# Patient Record
Sex: FEMALE | Race: WHITE | Employment: OTHER | URBAN - METROPOLITAN AREA
[De-identification: names, ages, dates, MRNs, and addresses within clinical notes are randomized per-mention and may not be internally consistent; named-entity substitution may affect disease eponyms.]

---

## 2018-03-05 PROBLEM — I10 ESSENTIAL HYPERTENSION: Status: ACTIVE | Noted: 2018-01-10

## 2018-03-05 PROBLEM — E78.2 MIXED HYPERLIPIDEMIA: Status: ACTIVE | Noted: 2018-01-10

## 2018-03-06 PROBLEM — Z78.0 MENOPAUSE: Status: ACTIVE | Noted: 2018-03-06

## 2018-03-06 PROCEDURE — 81003 URINALYSIS AUTO W/O SCOPE: CPT | Performed by: FAMILY MEDICINE

## 2018-03-06 PROCEDURE — 82043 UR ALBUMIN QUANTITATIVE: CPT | Performed by: FAMILY MEDICINE

## 2018-03-06 PROCEDURE — 82570 ASSAY OF URINE CREATININE: CPT | Performed by: FAMILY MEDICINE

## 2019-03-13 PROCEDURE — 81001 URINALYSIS AUTO W/SCOPE: CPT | Performed by: FAMILY MEDICINE

## 2019-03-13 PROCEDURE — 87086 URINE CULTURE/COLONY COUNT: CPT | Performed by: FAMILY MEDICINE

## 2020-08-06 ENCOUNTER — OFFICE VISIT (OUTPATIENT)
Dept: PODIATRY CLINIC | Facility: CLINIC | Age: 80
End: 2020-08-06
Payer: MEDICARE

## 2020-08-06 DIAGNOSIS — M79.675 PAIN IN TOES OF BOTH FEET: Primary | ICD-10-CM

## 2020-08-06 DIAGNOSIS — M79.674 PAIN IN TOES OF BOTH FEET: Primary | ICD-10-CM

## 2020-08-06 DIAGNOSIS — B35.1 ONYCHOMYCOSIS: ICD-10-CM

## 2020-08-06 PROCEDURE — 99202 OFFICE O/P NEW SF 15 MIN: CPT | Performed by: PODIATRIST

## 2020-08-06 PROCEDURE — 11720 DEBRIDE NAIL 1-5: CPT | Performed by: PODIATRIST

## 2020-08-06 NOTE — PROGRESS NOTES
HPI:    Patient ID: Sabina Aiken is a 70year old female. Pleasant 59-year-old female presents as a new patient to me and states that she is self-referred.   She is here with her daughter and the primary concern is painful toenails that she is no long debridement there is no ulceration or infection no open or draining was noted. I anticipate relief by this care and anticipate resolution of symptoms. I discussed the likely recurrence and will follow-up if and when that occurs.   She is well-informed

## 2020-12-03 ENCOUNTER — OFFICE VISIT (OUTPATIENT)
Dept: DERMATOLOGY CLINIC | Facility: CLINIC | Age: 80
End: 2020-12-03
Payer: MEDICARE

## 2020-12-03 DIAGNOSIS — L30.9 DERMATITIS: Primary | ICD-10-CM

## 2020-12-03 DIAGNOSIS — L65.9 ALOPECIA: ICD-10-CM

## 2020-12-03 DIAGNOSIS — Z51.81 MEDICATION MONITORING ENCOUNTER: ICD-10-CM

## 2020-12-03 DIAGNOSIS — L25.9 CONTACT DERMATITIS AND ECZEMA: ICD-10-CM

## 2020-12-03 PROCEDURE — 99202 OFFICE O/P NEW SF 15 MIN: CPT | Performed by: DERMATOLOGY

## 2020-12-03 PROCEDURE — G0463 HOSPITAL OUTPT CLINIC VISIT: HCPCS | Performed by: DERMATOLOGY

## 2020-12-03 RX ORDER — KETOCONAZOLE 20 MG/ML
SHAMPOO TOPICAL
Qty: 120 ML | Refills: 0 | Status: SHIPPED | OUTPATIENT
Start: 2020-12-03 | End: 2021-03-04

## 2020-12-03 RX ORDER — TRIAMCINOLONE ACETONIDE 5 MG/G
CREAM TOPICAL
Qty: 30 G | Refills: 0 | Status: SHIPPED | OUTPATIENT
Start: 2020-12-03 | End: 2021-01-21

## 2020-12-04 ENCOUNTER — LAB ENCOUNTER (OUTPATIENT)
Dept: LAB | Age: 80
End: 2020-12-04
Attending: DERMATOLOGY
Payer: MEDICARE

## 2020-12-04 ENCOUNTER — MED REC SCAN ONLY (OUTPATIENT)
Dept: DERMATOLOGY CLINIC | Facility: CLINIC | Age: 80
End: 2020-12-04

## 2020-12-04 ENCOUNTER — TELEPHONE (OUTPATIENT)
Dept: DERMATOLOGY CLINIC | Facility: CLINIC | Age: 80
End: 2020-12-04

## 2020-12-04 DIAGNOSIS — Z51.81 MEDICATION MONITORING ENCOUNTER: ICD-10-CM

## 2020-12-04 DIAGNOSIS — L30.9 DERMATITIS: ICD-10-CM

## 2020-12-04 DIAGNOSIS — L65.9 ALOPECIA: ICD-10-CM

## 2020-12-04 PROCEDURE — 82728 ASSAY OF FERRITIN: CPT

## 2020-12-04 PROCEDURE — 85025 COMPLETE CBC W/AUTO DIFF WBC: CPT

## 2020-12-04 PROCEDURE — 84443 ASSAY THYROID STIM HORMONE: CPT

## 2020-12-04 PROCEDURE — 36415 COLL VENOUS BLD VENIPUNCTURE: CPT

## 2020-12-04 NOTE — TELEPHONE ENCOUNTER
Pt stopped by [place] office dropped off copies from previous Dermatology Dr she had seen in the past, it has medication she was given for her problem. Pt stts Dr wanted a copy. Pt also wanted Dr Tina Danielle to know she had her labs done today.  The only one the

## 2020-12-05 ENCOUNTER — TELEPHONE (OUTPATIENT)
Dept: DERMATOLOGY CLINIC | Facility: CLINIC | Age: 80
End: 2020-12-05

## 2020-12-05 NOTE — TELEPHONE ENCOUNTER
Noted.  Vit d screening is commonly only covered for certain diagnoses. Ok to wait until she sees pcp. Will review notes nest week when I am in the [place] office. Await labs.

## 2020-12-05 NOTE — TELEPHONE ENCOUNTER
Pt called today stating, possible reaction from Triamcinolone that was prescribed on Thursday. Pt used medication X2 Friday. C/o eye puff ness yesterday. Pt pulled up records from previous DrIva And noticed that she has had an allergic reaction in the past fo

## 2020-12-05 NOTE — TELEPHONE ENCOUNTER
Records she dropped off after her visit are at [facility], so I cannot review until next week. Use 1 % hydrocortisone for now.      No past Derm records in [address] [place] [address] Rd

## 2020-12-07 NOTE — PROGRESS NOTES
Pt informed of test results - she does not have F/U scheduled, wants to know when you'd like her to follow up?

## 2020-12-08 NOTE — PROGRESS NOTES
S/w pt - she requested a F/U this week, \"I just want her to look at my face again to make sure I'm doing everything right\" appt made for 12/10/20 - pt aware she is seeing Dr. Tommie San at 10:20am - states she will call their office and try to change her iona

## 2020-12-10 ENCOUNTER — OFFICE VISIT (OUTPATIENT)
Dept: PODIATRY CLINIC | Facility: CLINIC | Age: 80
End: 2020-12-10
Payer: MEDICARE

## 2020-12-10 ENCOUNTER — OFFICE VISIT (OUTPATIENT)
Dept: DERMATOLOGY CLINIC | Facility: CLINIC | Age: 80
End: 2020-12-10
Payer: MEDICARE

## 2020-12-10 DIAGNOSIS — M79.675 PAIN IN TOES OF BOTH FEET: Primary | ICD-10-CM

## 2020-12-10 DIAGNOSIS — L25.9 CONTACT DERMATITIS AND ECZEMA: ICD-10-CM

## 2020-12-10 DIAGNOSIS — L30.9 DERMATITIS: Primary | ICD-10-CM

## 2020-12-10 DIAGNOSIS — B35.1 ONYCHOMYCOSIS: ICD-10-CM

## 2020-12-10 DIAGNOSIS — L65.9 ALOPECIA: ICD-10-CM

## 2020-12-10 DIAGNOSIS — Z51.81 MEDICATION MONITORING ENCOUNTER: ICD-10-CM

## 2020-12-10 DIAGNOSIS — M79.674 PAIN IN TOES OF BOTH FEET: Primary | ICD-10-CM

## 2020-12-10 PROCEDURE — 99213 OFFICE O/P EST LOW 20 MIN: CPT | Performed by: DERMATOLOGY

## 2020-12-10 PROCEDURE — G0463 HOSPITAL OUTPT CLINIC VISIT: HCPCS | Performed by: DERMATOLOGY

## 2020-12-10 PROCEDURE — 11721 DEBRIDE NAIL 6 OR MORE: CPT | Performed by: PODIATRIST

## 2020-12-10 RX ORDER — TOBRAMYCIN AND DEXAMETHASONE 3; 1 MG/ML; MG/ML
SUSPENSION/ DROPS OPHTHALMIC
COMMUNITY
Start: 2020-12-08 | End: 2021-06-22 | Stop reason: ALTCHOICE

## 2020-12-10 NOTE — PROGRESS NOTES
HPI:    Patient ID: Brianna Murguia is a 68year old female. 79-year-old female presents with recurrent pain associated with her nails. She reports relief by the previous care. The last time I saw this patient was August 6.     ROS:   I did go over her nails was accomplished today without incident. I reduced nail, subungual debris, and some keratosis. No ulcerations or infections were noted upon debridement. I anticipate relief and will see patient for care if and when symptoms redevelop.   She is well

## 2020-12-13 NOTE — PROGRESS NOTES
Andres Smith is a 68year old female. HPI:     CC:  Patient presents with:  Rash: new pt. presents with new rash to face. Tried cortisone 10 cream x3 days with good results but then flared up again. Face is dry, red. Denies itching.   Lesion: please c mouth daily. • Cholecalciferol (VITAMIN D) 1000 units Oral Tab Take 1,000 Units by mouth daily.      • tobramycin-dexamethasone 0.3-0.1 % Ophthalmic Suspension INSTILL 1 DROP IN EACH EYE TWICE DAILY FOR 10 DAYS     • benzonatate 200 MG Oral Cap Take 1 c on file        Attends Sikhism service: Not on file        Active member of club or organization: Not on file        Attends meetings of clubs or organizations: Not on file        Relationship status: Not on file      Intimate partner violence        [facility] nothing else recently. Has very sensitive skin. Has noted more longstanding hair thinning alopecia worsening over the last few months. No recent labs. Does feel that masks have been worsening symptoms.   Has been very careful with products in the past v triamcinolone acetonide 0.5 % External Cream 30 g 0     Sig: Use bid as directed     Patient not taking: Reported on 12/10/2020   • Ketoconazole 2 % External Shampoo 120 mL 0     Sig: Apply to scalp 2 times per week.          Dermatitis  (primary encounter diagnoses. Pros cons of various therapies, risks benefits discussed. Pathophysiology discussed with patient. Therapeutic options reviewed. See  Medications in grid. Instructions reviewed at length.     Benign nevi, seborrheic  keratoses, cherry angiomas:

## 2020-12-13 NOTE — PROGRESS NOTES
Ryland Colón is a 68year old female. Patient presents with:  Rash: LOV 12/3/2020. Follow up rash to face. Applied TAC that was prescribed at last vist but pt suspects medication was watering when she used the medication on her face.  Eye doctor r Apply to scalp 2 times per week. 120 mL 0   • LISINOPRIL-HYDROCHLOROTHIAZIDE 20-25 MG Oral Tab TAKE 1 TABLET BY MOUTH DAILY 90 tablet 0   • benzonatate 200 MG Oral Cap Take 1 capsule (200 mg total) by mouth 3 (three) times daily as needed for cough.  30 cap Substance and Sexual Activity      Alcohol use: No      Drug use: No      Sexual activity: Not on file    Lifestyle      Physical activity        Days per week: Not on file        Minutes per session: Not on file      Stress: Not on file    Relationships visit. Follow-up facial rash. Using cortisone 10 with improvement. Triamcinolone has old notes with possible reaction with similar cream from PCP several years ago. Reviewed. Note sent to scanning. Past history of dermatitis unknown cause.   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   We will continue hydrocortisone as ne diagnosis)  Alopecia  Medication monitoring encounter  Contact dermatitis and eczema    Results From Past 48 Hours:  No results found for this or any previous visit (from the past 50 hour(s)).     Meds This Visit:      Imaging Orders:  None     Referral Hillary Julio

## 2020-12-18 ENCOUNTER — TELEPHONE (OUTPATIENT)
Dept: DERMATOLOGY CLINIC | Facility: CLINIC | Age: 80
End: 2020-12-18

## 2020-12-18 NOTE — TELEPHONE ENCOUNTER
Left a message for patient to contact our office regarding her message. See patient's message below.

## 2020-12-18 NOTE — TELEPHONE ENCOUNTER
Patient called    States lobe of ear is hurting her. What can she put on her ear lobe. Sleeping at night it hurts. Does not hurt during the day. Also ears itching inside.  Please call

## 2020-12-21 ENCOUNTER — MED REC SCAN ONLY (OUTPATIENT)
Dept: DERMATOLOGY CLINIC | Facility: CLINIC | Age: 80
End: 2020-12-21

## 2021-01-13 ENCOUNTER — TELEPHONE (OUTPATIENT)
Dept: DERMATOLOGY CLINIC | Facility: CLINIC | Age: 81
End: 2021-01-13

## 2021-01-13 NOTE — TELEPHONE ENCOUNTER
LOV 12/10/20. Pt. Requesting new medication for dermatitis to face. Patient stopped using cortisone 10 x 2 weeks ago b/c rash to face was getting pinker after applying cortisone. Now patient only using cerave but face still pink to forehead and cheeks.

## 2021-01-13 NOTE — TELEPHONE ENCOUNTER
Pt states the med is not working for her face rash. Pt is coming in 1/21/21. Pt want know if doc want to gave her diff med.

## 2021-01-14 NOTE — TELEPHONE ENCOUNTER
If not itching, burning or painful, then would wait until her visit. She has a very poor tolerance of topicals. Continue Cerave.

## 2021-01-15 NOTE — TELEPHONE ENCOUNTER
Spoke with patient. Verified name and . Informed patient per Dr. González Simpson to continue with [facility] to the face as long as she does not have burning , itching or pain, will address at her visit on 21.  Patient verbalizes understanding of medication dire

## 2021-01-21 ENCOUNTER — OFFICE VISIT (OUTPATIENT)
Dept: DERMATOLOGY CLINIC | Facility: CLINIC | Age: 81
End: 2021-01-21
Payer: MEDICARE

## 2021-01-21 DIAGNOSIS — L30.9 DERMATITIS: Primary | ICD-10-CM

## 2021-01-21 PROCEDURE — 99213 OFFICE O/P EST LOW 20 MIN: CPT | Performed by: DERMATOLOGY

## 2021-01-21 NOTE — PATIENT INSTRUCTIONS
Bump above r brow is a milium, benign cyst from irritation, dryness. The smaller bumps are sebaceous hyperplasia from aging. For the red scaly rash ( seborrheic dermatitis) use the ciclopiroxolamine cream 2 times a day along with hydrocortisone.

## 2021-02-01 NOTE — PROGRESS NOTES
Edis Hector is a 68year old female. Patient presents with:  Derm Problem: LOV 12/10/2020. Follow up rash to face. States she feels like she is allergic to TAC. Pt applying moisturizer the last 2 weeks.              Triamcinolone  Current Outpati Omega-3-acid Ethyl Esters 1 g Oral Cap Take 1 capsule (1 g total) by mouth 2 (two) times daily. 180 capsule 2   • Lactobacillus-Inulin (CULTURELLE DIGESTIVE HEALTH OR) Take 1 capsule by mouth daily.      • Cholecalciferol (VITAMIN D) 1000 units Oral Tab [facility] connections        Talks on phone: Not on file        Gets together: Not on file        Attends Nondenominational service: Not on file        Active member of club or organization: Not on file        Attends meetings of clubs or organizations: Not on file        R unremarkable    Has noted decreased itching and irritation with 1-2 applications of the hydrocortisone. Previously    Patient presents with concerns above. ROS:    Denies any other systemic complaints.   No fevers, chills, night sweats, sensitivity to Continue supportive care. Caution with perm. Okay to wait on the ketoconazole shampoo. Continue vitamins good nutrition patient wonders whether stress may be a factor. Certainly additional topicals would not be warranted at this time.   Will monitor pre

## 2021-02-04 ENCOUNTER — OFFICE VISIT (OUTPATIENT)
Dept: DERMATOLOGY CLINIC | Facility: CLINIC | Age: 81
End: 2021-02-04
Payer: MEDICARE

## 2021-02-04 DIAGNOSIS — L72.0 EPIDERMAL CYST: Primary | ICD-10-CM

## 2021-02-04 DIAGNOSIS — L30.9 DERMATITIS: ICD-10-CM

## 2021-02-04 PROCEDURE — 99213 OFFICE O/P EST LOW 20 MIN: CPT | Performed by: DERMATOLOGY

## 2021-02-04 NOTE — PATIENT INSTRUCTIONS
Use the ciclopiroxolamine and the hydrocortisone together over the forehead,  Between eyebrows and above the eyebrows and temples where the rash is .     Use for 2 weeks at night,   Continue your moisturizer    Use neosporin on the cyst above the right eyeb

## 2021-02-15 NOTE — PROGRESS NOTES
Aiden Chandler is a 68year old female.     Patient presents with:  Derm Problem: LOV 1/21/21 patient presents for follow up for rash on face, forehead is slightly pink still, not itchy, has had for the past 3 months, is using Ciclopirox  cream and Hyd ([facility]) Take 1 capsule by mouth daily. • Cholecalciferol (VITAMIN D) 1000 units Oral Tab Take 1,000 Units by mouth daily. • Lisinopril-hydroCHLOROthiazide 20-25 MG Oral Tab Take 1 tablet by mouth daily.  90 tablet 0   • tobr Medical: Not on file        Non-medical: Not on file    Tobacco Use      Smoking status: Never Smoker      Smokeless tobacco: Never Used    Substance and Sexual Activity      Alcohol use: No      Drug use: No      Sexual activity: Not on file    Lifestyle lab results including pathology and past body maps reviewed. Updated and new information noted in current visit. Use the Loprox no hydrocortisone for 8 days eruption starting to come back. Patient frustrated.   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   Discussed options patient apprehensive regarding any antibiotic medications encouraged patient to use the Loprox daily may use the hydrocorti recognition.     Orders reviewed This Encounter      CBC W Differential W Platelet      TSH W Reflex To Free T4      Ferritin      Vitamin D, 25-Hydroxy [E]    Dermatitis  (primary encounter diagnosis)  Alopecia  Medication monitoring encounter  Contact yohan

## 2021-03-04 ENCOUNTER — OFFICE VISIT (OUTPATIENT)
Dept: DERMATOLOGY CLINIC | Facility: CLINIC | Age: 81
End: 2021-03-04
Payer: MEDICARE

## 2021-03-04 DIAGNOSIS — L30.9 DERMATITIS: Primary | ICD-10-CM

## 2021-03-04 DIAGNOSIS — Z51.81 MEDICATION MONITORING ENCOUNTER: ICD-10-CM

## 2021-03-04 DIAGNOSIS — L72.0 EPIDERMAL CYST: ICD-10-CM

## 2021-03-04 DIAGNOSIS — L73.8 SEBACEOUS HYPERPLASIA: ICD-10-CM

## 2021-03-04 PROCEDURE — 99213 OFFICE O/P EST LOW 20 MIN: CPT | Performed by: DERMATOLOGY

## 2021-03-09 DIAGNOSIS — Z23 NEED FOR VACCINATION: ICD-10-CM

## 2021-03-15 NOTE — PROGRESS NOTES
Chelsea Mathews is a 68year old female. Patient presents with:  Derm Problem: LOV 2/4/21. Follow up rash. Pt using ciclopirox olamine cream and OTC hydocortisone and moisturizer. Good results.    Lesion: pt applying neosporin to lesion on right fore • Omega-3-acid Ethyl Esters 1 g Oral Cap Take 1 capsule (1 g total) by mouth 2 (two) times daily. 180 capsule 2   • Lactobacillus-Inulin (CULTURELLE DIGESTIVE HEALTH OR) Take 1 capsule by mouth daily.      • Cholecalciferol (VITAMIN D) 1000 units Oral Tab Last Year:   Transportation Needs:       Lack of Transportation (Medical):       Lack of Transportation (Non-Medical):   Physical Activity:       Days of Exercise per Week:       Minutes of Exercise per Session:   Stress:       Feeling of Stress :   Social any problems per old dermatology notes  Has noted very sensitive skin. Concern with bumps over the forehead    Recent visit with PCP labs unremarkable    Has noted decreased itching and irritation with 1-2 applications of the hydrocortisone.   Previously this for several weeks. Patient very apprehensive regarding other topicals    Female pattern alopecia. Labs reviewed previously. Noncontributory. Continue monitoring continue gentle hair care. No erythema at anterior scalp presently.   Reviewed option history: 5 minutes, medical exam :10 minutes, notes on body map, plan, counseling 10minutes My total time spent caring for the patient on the day of the encounter: 25 minutes       Results From Past 48 Hours:  No results found for this or any previous visi

## 2021-04-01 ENCOUNTER — OFFICE VISIT (OUTPATIENT)
Dept: PODIATRY CLINIC | Facility: CLINIC | Age: 81
End: 2021-04-01
Payer: MEDICARE

## 2021-04-01 DIAGNOSIS — B35.1 ONYCHOMYCOSIS: ICD-10-CM

## 2021-04-01 DIAGNOSIS — M79.674 PAIN IN TOES OF BOTH FEET: Primary | ICD-10-CM

## 2021-04-01 DIAGNOSIS — M79.675 PAIN IN TOES OF BOTH FEET: Primary | ICD-10-CM

## 2021-04-01 PROCEDURE — 11721 DEBRIDE NAIL 6 OR MORE: CPT | Performed by: PODIATRIST

## 2021-04-01 NOTE — PROGRESS NOTES
HPI:    Patient ID: Linda Krishnan is a 68year old female. This 65-year-old female presents with recurrent pain associated with her toenails. The last time I saw this patient was December 10 that she reports relief by previous treatment.       ROS: of both feet  (primary encounter diagnosis)  Onychomycosis    No orders of the defined types were placed in this encounter.       Meds This Visit:  Requested Prescriptions      No prescriptions requested or ordered in this encounter       Imaging & Referral

## 2021-07-01 ENCOUNTER — OFFICE VISIT (OUTPATIENT)
Dept: PODIATRY CLINIC | Facility: CLINIC | Age: 81
End: 2021-07-01
Payer: MEDICARE

## 2021-07-01 DIAGNOSIS — M79.674 PAIN IN TOES OF BOTH FEET: Primary | ICD-10-CM

## 2021-07-01 DIAGNOSIS — B35.1 ONYCHOMYCOSIS: ICD-10-CM

## 2021-07-01 DIAGNOSIS — M79.675 PAIN IN TOES OF BOTH FEET: Primary | ICD-10-CM

## 2021-07-01 PROCEDURE — 11721 DEBRIDE NAIL 6 OR MORE: CPT | Performed by: PODIATRIST

## 2021-07-01 NOTE — PROGRESS NOTES
HPI:    Patient ID: Ayan Basurto is a 68year old female. 82-year-old female presents with recurrent pain associated with her toenails. The last time I saw her was April 1 of this year. She reports relief by previous treatment.     ROS:     I did go encounter.       Meds This Visit:  Requested Prescriptions      No prescriptions requested or ordered in this encounter       Imaging & Referrals:  None       AS#[id number]

## 2021-09-17 PROBLEM — R73.09 ELEVATED HEMOGLOBIN A1C: Status: ACTIVE | Noted: 2021-09-17

## 2021-11-11 ENCOUNTER — OFFICE VISIT (OUTPATIENT)
Dept: PODIATRY CLINIC | Facility: CLINIC | Age: 81
End: 2021-11-11
Payer: MEDICARE

## 2021-11-11 DIAGNOSIS — B35.1 ONYCHOMYCOSIS: ICD-10-CM

## 2021-11-11 DIAGNOSIS — M79.675 PAIN IN TOES OF BOTH FEET: Primary | ICD-10-CM

## 2021-11-11 DIAGNOSIS — M79.674 PAIN IN TOES OF BOTH FEET: Primary | ICD-10-CM

## 2021-11-11 PROCEDURE — 11721 DEBRIDE NAIL 6 OR MORE: CPT | Performed by: PODIATRIST

## 2021-11-11 NOTE — PROGRESS NOTES
HPI:    Patient ID: Tobi Shin is a 80year old female. 81-year-old female presents with recurrent frustrations associated with all of her toenails. Last time I saw this patient was July 1 and she reports relief by previous care.       ROS:     The

## 2022-02-10 ENCOUNTER — OFFICE VISIT (OUTPATIENT)
Dept: PODIATRY CLINIC | Facility: CLINIC | Age: 82
End: 2022-02-10
Payer: MEDICARE

## 2022-02-10 DIAGNOSIS — M79.675 PAIN IN TOES OF BOTH FEET: Primary | ICD-10-CM

## 2022-02-10 DIAGNOSIS — B35.1 ONYCHOMYCOSIS: ICD-10-CM

## 2022-02-10 DIAGNOSIS — M79.674 PAIN IN TOES OF BOTH FEET: Primary | ICD-10-CM

## 2022-02-10 PROCEDURE — 11721 DEBRIDE NAIL 6 OR MORE: CPT | Performed by: PODIATRIST

## 2022-02-16 ENCOUNTER — HOSPITAL ENCOUNTER (OUTPATIENT)
Age: 82
Discharge: ANOTHER HOSPITAL | End: 2022-02-16
Attending: EMERGENCY MEDICINE
Payer: MEDICARE

## 2022-02-16 ENCOUNTER — HOSPITAL ENCOUNTER (EMERGENCY)
Facility: HOSPITAL | Age: 82
Discharge: HOME | End: 2022-02-16
Attending: EMERGENCY MEDICINE
Payer: MEDICARE

## 2022-02-16 VITALS
WEIGHT: 171 LBS | SYSTOLIC BLOOD PRESSURE: 124 MMHG | DIASTOLIC BLOOD PRESSURE: 67 MMHG | OXYGEN SATURATION: 99 % | HEIGHT: 62 IN | BODY MASS INDEX: 31.47 KG/M2 | TEMPERATURE: 99 F | HEART RATE: 78 BPM | RESPIRATION RATE: 16 BRPM

## 2022-02-16 VITALS
TEMPERATURE: 98 F | SYSTOLIC BLOOD PRESSURE: 159 MMHG | HEART RATE: 152 BPM | DIASTOLIC BLOOD PRESSURE: 86 MMHG | OXYGEN SATURATION: 97 % | RESPIRATION RATE: 20 BRPM

## 2022-02-16 DIAGNOSIS — I47.9 PAROXYSMAL TACHYCARDIA (HCC): Primary | ICD-10-CM

## 2022-02-16 DIAGNOSIS — I48.91 ATRIAL FIBRILLATION AND FLUTTER (HCC): ICD-10-CM

## 2022-02-16 DIAGNOSIS — I48.92 ATRIAL FLUTTER WITH RAPID VENTRICULAR RESPONSE (HCC): Primary | ICD-10-CM

## 2022-02-16 DIAGNOSIS — I48.92 ATRIAL FIBRILLATION AND FLUTTER (HCC): ICD-10-CM

## 2022-02-16 DIAGNOSIS — I48.92 NEW ONSET ATRIAL FLUTTER (HCC): ICD-10-CM

## 2022-02-16 LAB
ANION GAP SERPL CALC-SCNC: 7 MMOL/L (ref 0–18)
APTT PPP: 44.4 SECONDS (ref 23.3–35.6)
BASOPHILS # BLD AUTO: 0.03 X10(3) UL (ref 0–0.2)
BASOPHILS NFR BLD AUTO: 0.3 %
BUN BLD-MCNC: 29 MG/DL (ref 7–18)
BUN/CREAT SERPL: 24.6 (ref 10–20)
CALCIUM BLD-MCNC: 9.8 MG/DL (ref 8.5–10.1)
CHLORIDE SERPL-SCNC: 100 MMOL/L (ref 98–112)
CO2 SERPL-SCNC: 28 MMOL/L (ref 21–32)
CREAT BLD-MCNC: 1.18 MG/DL
D DIMER PPP FEU-MCNC: 0.45 UG/ML FEU (ref ?–0.79)
DEPRECATED RDW RBC AUTO: 47.7 FL (ref 35.1–46.3)
EOSINOPHIL # BLD AUTO: 0.16 X10(3) UL (ref 0–0.7)
EOSINOPHIL NFR BLD AUTO: 1.8 %
ERYTHROCYTE [DISTWIDTH] IN BLOOD BY AUTOMATED COUNT: 13.3 % (ref 11–15)
GLUCOSE BLD-MCNC: 96 MG/DL (ref 70–99)
HCT VFR BLD AUTO: 39.7 %
HGB BLD-MCNC: 12.9 G/DL
IMM GRANULOCYTES # BLD AUTO: 0.02 X10(3) UL (ref 0–1)
IMM GRANULOCYTES NFR BLD: 0.2 %
LYMPHOCYTES # BLD AUTO: 2.17 X10(3) UL (ref 1–4)
LYMPHOCYTES NFR BLD AUTO: 24.4 %
MAGNESIUM SERPL-MCNC: 2.3 MG/DL (ref 1.6–2.6)
MCH RBC QN AUTO: 31.5 PG (ref 26–34)
MCHC RBC AUTO-ENTMCNC: 32.5 G/DL (ref 31–37)
MCV RBC AUTO: 96.8 FL
MONOCYTES # BLD AUTO: 0.69 X10(3) UL (ref 0.1–1)
MONOCYTES NFR BLD AUTO: 7.8 %
NEUTROPHILS # BLD AUTO: 5.81 X10 (3) UL (ref 1.5–7.7)
NEUTROPHILS # BLD AUTO: 5.81 X10(3) UL (ref 1.5–7.7)
NEUTROPHILS NFR BLD AUTO: 65.5 %
OSMOLALITY SERPL CALC.SUM OF ELEC: 286 MOSM/KG (ref 275–295)
PLATELET # BLD AUTO: 261 10(3)UL (ref 150–450)
POTASSIUM SERPL-SCNC: 4.1 MMOL/L (ref 3.5–5.1)
RBC # BLD AUTO: 4.1 X10(6)UL
SODIUM SERPL-SCNC: 135 MMOL/L (ref 136–145)
TSI SER-ACNC: 0.83 MIU/ML (ref 0.36–3.74)
WBC # BLD AUTO: 8.9 X10(3) UL (ref 4–11)

## 2022-02-16 PROCEDURE — 93010 ELECTROCARDIOGRAM REPORT: CPT

## 2022-02-16 PROCEDURE — 84443 ASSAY THYROID STIM HORMONE: CPT | Performed by: EMERGENCY MEDICINE

## 2022-02-16 PROCEDURE — 93010 ELECTROCARDIOGRAM REPORT: CPT | Performed by: EMERGENCY MEDICINE

## 2022-02-16 PROCEDURE — 80048 BASIC METABOLIC PNL TOTAL CA: CPT | Performed by: EMERGENCY MEDICINE

## 2022-02-16 PROCEDURE — 99285 EMERGENCY DEPT VISIT HI MDM: CPT

## 2022-02-16 PROCEDURE — 83735 ASSAY OF MAGNESIUM: CPT | Performed by: EMERGENCY MEDICINE

## 2022-02-16 PROCEDURE — 85025 COMPLETE CBC W/AUTO DIFF WBC: CPT | Performed by: EMERGENCY MEDICINE

## 2022-02-16 PROCEDURE — 96361 HYDRATE IV INFUSION ADD-ON: CPT

## 2022-02-16 PROCEDURE — 93005 ELECTROCARDIOGRAM TRACING: CPT

## 2022-02-16 PROCEDURE — 85379 FIBRIN DEGRADATION QUANT: CPT | Performed by: EMERGENCY MEDICINE

## 2022-02-16 PROCEDURE — 85730 THROMBOPLASTIN TIME PARTIAL: CPT | Performed by: EMERGENCY MEDICINE

## 2022-02-16 PROCEDURE — 96374 THER/PROPH/DIAG INJ IV PUSH: CPT

## 2022-02-16 PROCEDURE — 99214 OFFICE O/P EST MOD 30 MIN: CPT

## 2022-02-16 RX ORDER — DILTIAZEM HYDROCHLORIDE 120 MG/1
120 CAPSULE, COATED, EXTENDED RELEASE ORAL DAILY
Qty: 30 CAPSULE | Refills: 0 | Status: SHIPPED | OUTPATIENT
Start: 2022-02-16 | End: 2022-03-09

## 2022-02-16 RX ORDER — DILTIAZEM HYDROCHLORIDE 5 MG/ML
20 INJECTION INTRAVENOUS ONCE
Status: DISCONTINUED | OUTPATIENT
Start: 2022-02-16 | End: 2022-02-16

## 2022-02-16 RX ORDER — DILTIAZEM HYDROCHLORIDE 5 MG/ML
10 INJECTION INTRAVENOUS ONCE
Status: DISCONTINUED | OUTPATIENT
Start: 2022-02-16 | End: 2022-02-16

## 2022-02-16 RX ORDER — DILTIAZEM HYDROCHLORIDE 120 MG/1
120 CAPSULE, EXTENDED RELEASE ORAL ONCE
Status: COMPLETED | OUTPATIENT
Start: 2022-02-16 | End: 2022-02-16

## 2022-02-16 RX ORDER — DILTIAZEM HYDROCHLORIDE 5 MG/ML
20 INJECTION INTRAVENOUS ONCE
Status: COMPLETED | OUTPATIENT
Start: 2022-02-16 | End: 2022-02-16

## 2022-02-16 RX ORDER — DILTIAZEM HYDROCHLORIDE 5 MG/ML
INJECTION INTRAVENOUS
Status: DISCONTINUED
Start: 2022-02-16 | End: 2022-02-16

## 2022-02-16 NOTE — ED INITIAL ASSESSMENT (HPI)
Patient arrived with her daughters and reports having dizziness and palpitations all day. Patient denies any shortness of breath. Patient took Tylenol at home. EKG to be completed.

## 2022-02-17 ENCOUNTER — APPOINTMENT (OUTPATIENT)
Dept: CT IMAGING | Facility: HOSPITAL | Age: 82
End: 2022-02-17
Attending: EMERGENCY MEDICINE
Payer: MEDICARE

## 2022-02-17 ENCOUNTER — APPOINTMENT (OUTPATIENT)
Dept: CV DIAGNOSTICS | Facility: HOSPITAL | Age: 82
End: 2022-02-17
Attending: INTERNAL MEDICINE
Payer: MEDICARE

## 2022-02-17 ENCOUNTER — HOSPITAL ENCOUNTER (OUTPATIENT)
Facility: HOSPITAL | Age: 82
Setting detail: OBSERVATION
Discharge: HOME | End: 2022-02-18
Attending: EMERGENCY MEDICINE | Admitting: STUDENT IN AN ORGANIZED HEALTH CARE EDUCATION/TRAINING PROGRAM
Payer: MEDICARE

## 2022-02-17 DIAGNOSIS — I10 ESSENTIAL HYPERTENSION: ICD-10-CM

## 2022-02-17 DIAGNOSIS — R55 SYNCOPE AND COLLAPSE: Primary | ICD-10-CM

## 2022-02-17 PROBLEM — I48.3 TYPICAL ATRIAL FLUTTER (HCC): Status: ACTIVE | Noted: 2022-02-17

## 2022-02-17 PROBLEM — E87.1 HYPONATREMIA: Status: ACTIVE | Noted: 2022-02-17

## 2022-02-17 LAB
ANION GAP SERPL CALC-SCNC: 7 MMOL/L (ref 0–18)
BASOPHILS # BLD AUTO: 0.05 X10(3) UL (ref 0–0.2)
BASOPHILS NFR BLD AUTO: 0.6 %
BUN BLD-MCNC: 24 MG/DL (ref 7–18)
BUN/CREAT SERPL: 22.4 (ref 10–20)
CALCIUM BLD-MCNC: 9.3 MG/DL (ref 8.5–10.1)
CHLORIDE SERPL-SCNC: 105 MMOL/L (ref 98–112)
CO2 SERPL-SCNC: 28 MMOL/L (ref 21–32)
CREAT BLD-MCNC: 1.07 MG/DL
DEPRECATED RDW RBC AUTO: 47.8 FL (ref 35.1–46.3)
EOSINOPHIL # BLD AUTO: 0.21 X10(3) UL (ref 0–0.7)
EOSINOPHIL NFR BLD AUTO: 2.3 %
ERYTHROCYTE [DISTWIDTH] IN BLOOD BY AUTOMATED COUNT: 13.4 % (ref 11–15)
GLUCOSE BLD-MCNC: 108 MG/DL (ref 70–99)
HCT VFR BLD AUTO: 40.2 %
HGB BLD-MCNC: 13.5 G/DL
IMM GRANULOCYTES # BLD AUTO: 0.03 X10(3) UL (ref 0–1)
IMM GRANULOCYTES NFR BLD: 0.3 %
LYMPHOCYTES # BLD AUTO: 2.52 X10(3) UL (ref 1–4)
LYMPHOCYTES NFR BLD AUTO: 27.8 %
MCH RBC QN AUTO: 32.5 PG (ref 26–34)
MCHC RBC AUTO-ENTMCNC: 33.6 G/DL (ref 31–37)
MCV RBC AUTO: 96.6 FL
MONOCYTES # BLD AUTO: 0.69 X10(3) UL (ref 0.1–1)
MONOCYTES NFR BLD AUTO: 7.6 %
NEUTROPHILS # BLD AUTO: 5.55 X10 (3) UL (ref 1.5–7.7)
NEUTROPHILS # BLD AUTO: 5.55 X10(3) UL (ref 1.5–7.7)
NEUTROPHILS NFR BLD AUTO: 61.4 %
OSMOLALITY SERPL CALC.SUM OF ELEC: 295 MOSM/KG (ref 275–295)
PLATELET # BLD AUTO: 273 10(3)UL (ref 150–450)
POTASSIUM SERPL-SCNC: 3.7 MMOL/L (ref 3.5–5.1)
RBC # BLD AUTO: 4.16 X10(6)UL
SARS-COV-2 RNA RESP QL NAA+PROBE: NOT DETECTED
SODIUM SERPL-SCNC: 140 MMOL/L (ref 136–145)
TROPONIN I HIGH SENSITIVITY: 44 NG/L
WBC # BLD AUTO: 9.1 X10(3) UL (ref 4–11)

## 2022-02-17 PROCEDURE — 85025 COMPLETE CBC W/AUTO DIFF WBC: CPT

## 2022-02-17 PROCEDURE — 80048 BASIC METABOLIC PNL TOTAL CA: CPT | Performed by: EMERGENCY MEDICINE

## 2022-02-17 PROCEDURE — 93306 TTE W/DOPPLER COMPLETE: CPT | Performed by: INTERNAL MEDICINE

## 2022-02-17 PROCEDURE — 85025 COMPLETE CBC W/AUTO DIFF WBC: CPT | Performed by: EMERGENCY MEDICINE

## 2022-02-17 PROCEDURE — 99285 EMERGENCY DEPT VISIT HI MDM: CPT

## 2022-02-17 PROCEDURE — 70450 CT HEAD/BRAIN W/O DYE: CPT | Performed by: EMERGENCY MEDICINE

## 2022-02-17 PROCEDURE — 96360 HYDRATION IV INFUSION INIT: CPT

## 2022-02-17 PROCEDURE — 93010 ELECTROCARDIOGRAM REPORT: CPT | Performed by: EMERGENCY MEDICINE

## 2022-02-17 PROCEDURE — 36415 COLL VENOUS BLD VENIPUNCTURE: CPT

## 2022-02-17 PROCEDURE — 93005 ELECTROCARDIOGRAM TRACING: CPT

## 2022-02-17 PROCEDURE — 84484 ASSAY OF TROPONIN QUANT: CPT | Performed by: EMERGENCY MEDICINE

## 2022-02-17 RX ORDER — ACETAMINOPHEN 325 MG/1
650 TABLET ORAL EVERY 6 HOURS PRN
Status: DISCONTINUED | OUTPATIENT
Start: 2022-02-17 | End: 2022-02-18

## 2022-02-17 RX ORDER — CARBOXYMETHYLCELLULOSE SODIUM 10 MG/ML
1 GEL OPHTHALMIC AS NEEDED
COMMUNITY

## 2022-02-17 RX ORDER — MELATONIN
2000 DAILY
Status: DISCONTINUED | OUTPATIENT
Start: 2022-02-17 | End: 2022-02-18

## 2022-02-17 RX ORDER — CARBOXYMETHYLCELLULOSE SODIUM 10 MG/ML
1 GEL OPHTHALMIC AS NEEDED
Status: DISCONTINUED | OUTPATIENT
Start: 2022-02-17 | End: 2022-02-18

## 2022-02-17 RX ORDER — POTASSIUM CHLORIDE 20 MEQ/1
40 TABLET, EXTENDED RELEASE ORAL ONCE
Status: COMPLETED | OUTPATIENT
Start: 2022-02-17 | End: 2022-02-17

## 2022-02-17 RX ORDER — ATORVASTATIN CALCIUM 40 MG/1
40 TABLET, FILM COATED ORAL NIGHTLY
Status: DISCONTINUED | OUTPATIENT
Start: 2022-02-17 | End: 2022-02-18

## 2022-02-17 RX ORDER — ATORVASTATIN CALCIUM 40 MG/1
40 TABLET, FILM COATED ORAL DAILY
Status: DISCONTINUED | OUTPATIENT
Start: 2022-02-17 | End: 2022-02-17

## 2022-02-17 RX ORDER — DILTIAZEM HYDROCHLORIDE 120 MG/1
120 CAPSULE, EXTENDED RELEASE ORAL DAILY
Status: DISCONTINUED | OUTPATIENT
Start: 2022-02-17 | End: 2022-02-18

## 2022-02-17 NOTE — PLAN OF CARE
Problem: Patient Centered Care  Goal: Patient preferences are identified and integrated in the patient's plan of care  Description: Interventions:  - What would you like us to know as we care for you?  \" I live alone, I was just in the ER yesterday\"  - Provide timely, complete, and accurate information to patient/family  - Incorporate patient and family knowledge, values, beliefs, and cultural backgrounds into the planning and delivery of care  - Encourage patient/family to participate in care and decision-making at the level they choose  - Honor patient and family perspectives and choices  Outcome: Progressing     Problem: Patient/Family Goals  Goal: Patient/Family Long Term Goal  Description: Patient's Long Term Goal: no more fluttering    Interventions:  - medications as per physician orders  - See additional Care Plan goals for specific interventions  Outcome: Progressing  Goal: Patient/Family Short Term Goal  Description: Patient's Short Term Goal: have echocardiogram completed soon    Interventions:   - inform pt as soon as possible of potential time for echocardiogram   - See additional Care Plan goals for specific interventions  Outcome: Progressing

## 2022-02-17 NOTE — ED QUICK NOTES
Orders for admission. Patient and/or next of kin aware of plan and is ready to go upstairs. Please call ED LEXY Sandoval at listed extension should you have any further questions or concerns. Thank you. Nurse and ExtRoselind Mary [facility], [facility]    Chief Presentation: SYNCOPE    Admission Diagnosis: SYNCOPE AND COLLAPSE    Admitting/Attending Physician: Mauro Bourne    Neuro: A&OX3    Cardiac: NSR/SB (HIGH-50'S). NO ECTOPY NOTED THROUGHOUT ED STAY. Resp: ROOM AIR    GI: WNL    : WNL    Skin/IV: L AC (18) PATENT AND SALINE LOCKED.     Other Pertinent Information: N/A    Type of COVID Test Sent: RAPID    COVID Level of Suspicion: LOW HIGH    PRIMARY CARE PARTNER:

## 2022-02-17 NOTE — CM/SW NOTE
Unable to make [facility] appointment f/u from ER visit as pt returned and was admitted into hospital.    CM to remain available for support and/or discharge planning.     Arnulfo Obregon RN, [facility]  ER   Ext. [phone]

## 2022-02-17 NOTE — CM/SW NOTE
Per Dr. Yessenia Hayes, patient will need an ER f/u appt with [facility] cardiologist.    [facility] to call and schedule earliest appt and call patient with appt time and date.

## 2022-02-17 NOTE — SPIRITUAL CARE NOTE
Spiritual Care Consult filed as pt requested Rastafarian anointing, which the  ordered.  left with a blessing.

## 2022-02-17 NOTE — ED QUICK NOTES
Patient ready for dc, about to leave ED, however patient stated she was feeling dizzy and palpitations again. Patient immediately placed on monitor. -117. ERMD notified. Patient HR in 70s within 2-3minutes. Per ERMD, give another dose of PO cardizem. Patient given additional dose of cardizem (see MAR). After dose of cardizem, patient ok to be discharged.

## 2022-02-17 NOTE — ED INITIAL ASSESSMENT (HPI)
Pt to ed with daughter after fall @home. Pt sts she got up to use the washroom and \"passed out. \" Pt was discharged yesterday evening from ER w/new dx afib and started on eliquis.

## 2022-02-18 ENCOUNTER — APPOINTMENT (OUTPATIENT)
Dept: GENERAL RADIOLOGY | Facility: HOSPITAL | Age: 82
End: 2022-02-18
Attending: INTERNAL MEDICINE
Payer: MEDICARE

## 2022-02-18 VITALS
HEART RATE: 60 BPM | BODY MASS INDEX: 31.43 KG/M2 | OXYGEN SATURATION: 97 % | WEIGHT: 170.81 LBS | SYSTOLIC BLOOD PRESSURE: 132 MMHG | HEIGHT: 62 IN | TEMPERATURE: 97 F | DIASTOLIC BLOOD PRESSURE: 57 MMHG | RESPIRATION RATE: 18 BRPM

## 2022-02-18 PROBLEM — E87.1 HYPONATREMIA: Status: RESOLVED | Noted: 2022-02-17 | Resolved: 2022-02-18

## 2022-02-18 PROBLEM — R55 SYNCOPE AND COLLAPSE: Status: RESOLVED | Noted: 2022-02-17 | Resolved: 2022-02-18

## 2022-02-18 LAB
ANION GAP SERPL CALC-SCNC: 6 MMOL/L (ref 0–18)
BASOPHILS # BLD AUTO: 0.02 X10(3) UL (ref 0–0.2)
BASOPHILS NFR BLD AUTO: 0.4 %
BUN BLD-MCNC: 19 MG/DL (ref 7–18)
BUN/CREAT SERPL: 22.6 (ref 10–20)
CALCIUM BLD-MCNC: 9 MG/DL (ref 8.5–10.1)
CHLORIDE SERPL-SCNC: 109 MMOL/L (ref 98–112)
CO2 SERPL-SCNC: 28 MMOL/L (ref 21–32)
CREAT BLD-MCNC: 0.84 MG/DL
DEPRECATED RDW RBC AUTO: 50.4 FL (ref 35.1–46.3)
EOSINOPHIL # BLD AUTO: 0.15 X10(3) UL (ref 0–0.7)
EOSINOPHIL NFR BLD AUTO: 3 %
ERYTHROCYTE [DISTWIDTH] IN BLOOD BY AUTOMATED COUNT: 13.7 % (ref 11–15)
GLUCOSE BLD-MCNC: 93 MG/DL (ref 70–99)
HCT VFR BLD AUTO: 36 %
HGB BLD-MCNC: 11.7 G/DL
IMM GRANULOCYTES # BLD AUTO: 0 X10(3) UL (ref 0–1)
IMM GRANULOCYTES NFR BLD: 0 %
LYMPHOCYTES # BLD AUTO: 1.62 X10(3) UL (ref 1–4)
LYMPHOCYTES NFR BLD AUTO: 32.3 %
MCH RBC QN AUTO: 32.4 PG (ref 26–34)
MCHC RBC AUTO-ENTMCNC: 32.5 G/DL (ref 31–37)
MCV RBC AUTO: 99.7 FL
MONOCYTES # BLD AUTO: 0.41 X10(3) UL (ref 0.1–1)
NEUTROPHILS # BLD AUTO: 2.81 X10 (3) UL (ref 1.5–7.7)
NEUTROPHILS # BLD AUTO: 2.81 X10(3) UL (ref 1.5–7.7)
NEUTROPHILS NFR BLD AUTO: 56.1 %
OSMOLALITY SERPL CALC.SUM OF ELEC: 298 MOSM/KG (ref 275–295)
PLATELET # BLD AUTO: 216 10(3)UL (ref 150–450)
POTASSIUM SERPL-SCNC: 4.1 MMOL/L (ref 3.5–5.1)
POTASSIUM SERPL-SCNC: 4.1 MMOL/L (ref 3.5–5.1)
RBC # BLD AUTO: 3.61 X10(6)UL
SODIUM SERPL-SCNC: 143 MMOL/L (ref 136–145)
WBC # BLD AUTO: 5 X10(3) UL (ref 4–11)

## 2022-02-18 PROCEDURE — 84132 ASSAY OF SERUM POTASSIUM: CPT | Performed by: INTERNAL MEDICINE

## 2022-02-18 PROCEDURE — 85025 COMPLETE CBC W/AUTO DIFF WBC: CPT | Performed by: INTERNAL MEDICINE

## 2022-02-18 PROCEDURE — 80048 BASIC METABOLIC PNL TOTAL CA: CPT | Performed by: INTERNAL MEDICINE

## 2022-02-18 PROCEDURE — 73630 X-RAY EXAM OF FOOT: CPT | Performed by: INTERNAL MEDICINE

## 2022-02-18 RX ORDER — HYDROCHLOROTHIAZIDE 12.5 MG/1
12.5 TABLET ORAL DAILY
Qty: 30 TABLET | Refills: 0 | Status: SHIPPED | OUTPATIENT
Start: 2022-02-18 | End: 2022-03-20

## 2022-02-18 NOTE — PLAN OF CARE
Rec'd order for discharge, saline lock and telemetry monitor removed. Patient and daughter were given discharge instructions including when to take next doses and to make follow up appointments with primary and cardiology. All questions answered. Prescription sent to pt's pharmacy, pt will . Shaunna Johnson discharged to her daughter's vehicle in good condition. Problem: Patient Centered Care  Goal: Patient preferences are identified and integrated in the patient's plan of care  Description: Interventions:  - What would you like us to know as we care for you?  \" I live alone, I was just in the ER yesterday\"  - Provide timely, complete, and accurate information to patient/family  - Incorporate patient and family knowledge, values, beliefs, and cultural backgrounds into the planning and delivery of care  - Encourage patient/family to participate in care and decision-making at the level they choose  - Honor patient and family perspectives and choices  Outcome: Adequate for Discharge     Problem: Patient/Family Goals  Goal: Patient/Family Long Term Goal  Description: Patient's Long Term Goal: no more fluttering    Interventions:  - medications as per physician orders  - See additional Care Plan goals for specific interventions  Outcome: Adequate for Discharge  Goal: Patient/Family Short Term Goal  Description: Patient's Short Term Goal: have echocardiogram completed soon    Interventions:   - inform pt as soon as possible of potential time for echocardiogram   - See additional Care Plan goals for specific interventions  Outcome: Adequate for Discharge

## 2022-05-12 ENCOUNTER — OFFICE VISIT (OUTPATIENT)
Dept: PODIATRY CLINIC | Facility: CLINIC | Age: 82
End: 2022-05-12
Payer: MEDICARE

## 2022-05-12 DIAGNOSIS — B35.1 ONYCHOMYCOSIS: ICD-10-CM

## 2022-05-12 DIAGNOSIS — M79.674 PAIN IN TOES OF BOTH FEET: Primary | ICD-10-CM

## 2022-05-12 DIAGNOSIS — M79.675 PAIN IN TOES OF BOTH FEET: Primary | ICD-10-CM

## 2022-05-12 RX ORDER — METOPROLOL SUCCINATE 25 MG/1
25 TABLET, EXTENDED RELEASE ORAL NIGHTLY
COMMUNITY
Start: 2022-04-18 | End: 2022-05-18

## 2022-08-25 ENCOUNTER — OFFICE VISIT (OUTPATIENT)
Dept: PODIATRY CLINIC | Facility: CLINIC | Age: 82
End: 2022-08-25
Payer: MEDICARE

## 2022-08-25 DIAGNOSIS — M79.674 PAIN IN TOES OF BOTH FEET: Primary | ICD-10-CM

## 2022-08-25 DIAGNOSIS — M79.675 PAIN IN TOES OF BOTH FEET: Primary | ICD-10-CM

## 2022-08-25 DIAGNOSIS — B35.1 ONYCHOMYCOSIS: ICD-10-CM

## 2022-08-25 PROCEDURE — 11721 DEBRIDE NAIL 6 OR MORE: CPT | Performed by: PODIATRIST

## 2022-08-25 RX ORDER — METOPROLOL SUCCINATE 25 MG/1
25 TABLET, EXTENDED RELEASE ORAL DAILY
COMMUNITY
Start: 2022-08-12

## 2022-12-08 ENCOUNTER — OFFICE VISIT (OUTPATIENT)
Dept: PODIATRY CLINIC | Facility: CLINIC | Age: 82
End: 2022-12-08
Payer: MEDICARE

## 2022-12-08 DIAGNOSIS — M79.674 PAIN IN TOES OF BOTH FEET: Primary | ICD-10-CM

## 2022-12-08 DIAGNOSIS — B35.1 ONYCHOMYCOSIS: ICD-10-CM

## 2022-12-08 DIAGNOSIS — M79.675 PAIN IN TOES OF BOTH FEET: Primary | ICD-10-CM

## 2023-03-15 ENCOUNTER — OFFICE VISIT (OUTPATIENT)
Dept: PODIATRY CLINIC | Facility: CLINIC | Age: 83
End: 2023-03-15

## 2023-03-15 DIAGNOSIS — B35.1 ONYCHOMYCOSIS: Primary | ICD-10-CM

## 2023-03-15 DIAGNOSIS — M79.674 TOE PAIN, BILATERAL: ICD-10-CM

## 2023-03-15 DIAGNOSIS — M79.675 TOE PAIN, BILATERAL: ICD-10-CM

## 2023-03-15 PROCEDURE — 99213 OFFICE O/P EST LOW 20 MIN: CPT | Performed by: PODIATRIST

## 2023-06-22 ENCOUNTER — TELEPHONE (OUTPATIENT)
Dept: PODIATRY CLINIC | Facility: CLINIC | Age: 83
End: 2023-06-22

## 2023-06-22 ENCOUNTER — HOSPITAL ENCOUNTER (OUTPATIENT)
Age: 83
Discharge: HOME | End: 2023-06-22
Payer: MEDICARE

## 2023-06-22 VITALS
RESPIRATION RATE: 16 BRPM | SYSTOLIC BLOOD PRESSURE: 155 MMHG | DIASTOLIC BLOOD PRESSURE: 55 MMHG | OXYGEN SATURATION: 98 % | HEART RATE: 65 BPM | TEMPERATURE: 98 F

## 2023-06-22 DIAGNOSIS — M10.9 ACUTE GOUT INVOLVING TOE OF RIGHT FOOT, UNSPECIFIED CAUSE: Primary | ICD-10-CM

## 2023-06-22 PROCEDURE — 99213 OFFICE O/P EST LOW 20 MIN: CPT

## 2023-06-22 RX ORDER — METHYLPREDNISOLONE 4 MG/1
TABLET ORAL
Qty: 1 EACH | Refills: 0 | Status: SHIPPED | OUTPATIENT
Start: 2023-06-22

## 2023-06-22 NOTE — TELEPHONE ENCOUNTER
Patient was at the immediate care in [address] [place] [address] today 6/22/23 due to foot pain was told to schedule a sooner appointment with Dr Janes Zhu     no available appointments  Please advise

## 2023-06-22 NOTE — DISCHARGE INSTRUCTIONS
As discussed, you may take Tylenol as needed for pain relief. Follow-up with podiatrist.  I have prescribed Medrol Dosepak. As discussed, you may preload with Benadryl. Follow instructions. However, if you do begin to have a rash, any swelling of lips, tongue, back of throat, wheezing, and respiratory distress, call 911 and/or go to ER. Keep an eye on area. If redness, swelling, pain worsens and/or you have fevers above 100.4, please go to emergency room.

## 2023-06-22 NOTE — ED INITIAL ASSESSMENT (HPI)
Patient arrives ambulatory with c/o right foot pain near her big toe, redness, and swelling since Monday. Patient states she missed her podiatry appt earlier this week.

## 2023-06-22 NOTE — TELEPHONE ENCOUNTER
Patient went to  today 6/22/23 for right toe pain. Missed appointment with our office on 6/21/23. Was told to follow up soon with her podiatrist.     Please advise if we could add her on the schedule next week.

## 2023-06-23 NOTE — TELEPHONE ENCOUNTER
Called pt and appt made on 7/3/23 @ 724ym at [facility] with Dr Sammy Carbajal.

## 2023-07-03 ENCOUNTER — LAB ENCOUNTER (OUTPATIENT)
Dept: LAB | Facility: HOSPITAL | Age: 83
End: 2023-07-03
Attending: PODIATRIST
Payer: MEDICARE

## 2023-07-03 ENCOUNTER — OFFICE VISIT (OUTPATIENT)
Dept: PODIATRY CLINIC | Facility: CLINIC | Age: 83
End: 2023-07-03

## 2023-07-03 ENCOUNTER — HOSPITAL ENCOUNTER (OUTPATIENT)
Dept: GENERAL RADIOLOGY | Facility: HOSPITAL | Age: 83
Discharge: HOME | End: 2023-07-03
Attending: PODIATRIST
Payer: MEDICARE

## 2023-07-03 DIAGNOSIS — M10.071 ACUTE IDIOPATHIC GOUT OF RIGHT FOOT: ICD-10-CM

## 2023-07-03 DIAGNOSIS — M79.671 RIGHT FOOT PAIN: ICD-10-CM

## 2023-07-03 DIAGNOSIS — M10.071 ACUTE IDIOPATHIC GOUT OF RIGHT FOOT: Primary | ICD-10-CM

## 2023-07-03 LAB — URATE SERPL-MCNC: 5.9 MG/DL

## 2023-07-03 PROCEDURE — 36415 COLL VENOUS BLD VENIPUNCTURE: CPT

## 2023-07-03 PROCEDURE — 84550 ASSAY OF BLOOD/URIC ACID: CPT

## 2023-07-03 PROCEDURE — 73630 X-RAY EXAM OF FOOT: CPT | Performed by: PODIATRIST

## 2023-07-03 RX ORDER — TRIAMCINOLONE ACETONIDE 40 MG/ML
40 INJECTION, SUSPENSION INTRA-ARTICULAR; INTRAMUSCULAR ONCE
Status: COMPLETED | OUTPATIENT
Start: 2023-07-03 | End: 2023-07-03

## 2023-07-03 RX ADMIN — TRIAMCINOLONE ACETONIDE 40 MG: 40 INJECTION, SUSPENSION INTRA-ARTICULAR; INTRAMUSCULAR at 10:30:00

## 2023-07-17 ENCOUNTER — OFFICE VISIT (OUTPATIENT)
Dept: PODIATRY CLINIC | Facility: CLINIC | Age: 83
End: 2023-07-17

## 2023-07-17 DIAGNOSIS — M10.071 ACUTE IDIOPATHIC GOUT OF RIGHT FOOT: Primary | ICD-10-CM

## 2023-07-17 PROCEDURE — 99213 OFFICE O/P EST LOW 20 MIN: CPT | Performed by: PODIATRIST

## 2023-07-17 NOTE — PROGRESS NOTES
[facility], [facility] Podiatry  Progress Note    Karen Vee is a [de-identified]year old female. Patient presents with: Follow - Up: Patient received right hallux injection on 7/3/23. Here to follow-up on injection and discuss uric acid results. States that cortisone injection provided good relief. Rates pain at 1/10. HPI:     This is a pleasant female with Afib on eqliuis. She presents to clinic today due to right great toe joint pain f/u. She states the injection helped greatly and denies any pain. She did get the uric acid test done. Allergies: Latex and Triamcinolone   Current Outpatient Medications   Medication Sig Dispense Refill    methylPREDNISolone (MEDROL) 4 MG Oral Tablet Therapy Pack Dosepack: take as directed 1 each 0    metoprolol succinate ER 25 MG Oral Tablet 24 Hr Take 1 tablet (25 mg total) by mouth daily. carboxymethylcellulose sod PF 1 % Ophthalmic Gel 1 drop as needed. Coenzyme Q10 (COQ-10 OR) Take by mouth. atorvastatin 40 MG Oral Tab Take 1 tablet (40 mg total) by mouth daily. 90 tablet 3    Omega-3-acid Ethyl Esters 1 g Oral Cap Take 1 capsule (1 g total) by mouth 2 (two) times daily. 180 capsule 2    Lactobacillus-Inulin (CULTURELLE DIGESTIVE HEALTH OR) Take 1 capsule by mouth daily. Cholecalciferol (VITAMIN D) 1000 units Oral Tab Take 2,000 Units by mouth daily.         Past Medical History:   Diagnosis Date    Atrial flutter [facility])     Colitis     descending    Essential hypertension     Osteoarthritis, knee     Vitamin D deficiency       Past Surgical History:   Procedure Laterality Date    COLONOSCOPY  2013    sessile serrated adenoma, no dysplasia    KNEE REPLACEMENT SURGERY Right 2018    KNEE REPLACEMENT SURGERY Left 2019    Dr. Phoebe Cartwright Left     lapraroscopic          x 5     OTHER SURGICAL HISTORY      laparscopic left knee sx      Family History   Problem Relation Age of Onset    No Known Problems Mother Stroke Father 46    No Known Problems Maternal Grandmother     No Known Problems Maternal Grandfather     No Known Problems Paternal Grandmother     No Known Problems Paternal Grandfather     No Known Problems Daughter     Hypertension Sister     Cancer Brother 58        prostate ca     Hypertension Sister       Social History    Socioeconomic History      Marital status:     Occupational History      Occupation: reitred paraprofessional at school     Tobacco Use      Smoking status: Never      Smokeless tobacco: Never    Vaping Use      Vaping Use: Never used    Substance and Sexual Activity      Alcohol use: No      Drug use: No    Other Topics      Concerns:        Reaction to local anesthetic: No          REVIEW OF SYSTEMS:   Denies nausea, fever, chills  No calf pain  No other muscle or joint aches  Denies chest pain or shortness of breath. EXAM:   There were no vitals taken for this visit. Constitutional:   Patient in no apparent distress. Well kept. Of normal body habitus. Alert and oriented to person, place, and time. Vascular Examination:  DP pulse is 2/4  PT pulse is 2/4  Capillary refill is immediate  Integumentary Examination:   The patient's nails appear incurvated, thickened, elongated, dystrophic, discolored with subungual debris 1-5 right, 1-5  left nails. Digital hair growth is present left and is present right. Skin is of diminished texture and decreased turgor. Neurological Examination:  Sharp/dull is present to right and is present to left. Parasthesias absent. Musculoskeletal Examination:  Muscle Strength is 5/5. 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             LABS & IMAGING:     Lab Results   Component Value Date    GLU 87 03/30/2022    BUN 18.0 03/30/2022    CREATSERUM 0.75 03/30/2022    BUNCREA 24.0 (H) 03/30/2022    ANIONGAP 6 02/18/2022    GFRAA 76 02/18/2022    GFRNAA 66 02/18/2022    CA 10.1 03/30/2022     03/30/2022    K 3.98 03/30/2022     03/30/2022    CO2 27.4 03/30/2022    OSMOCALC 298 (H) 02/18/2022        Lab Results   Component Value Date     09/17/2021    A1C 5.8 (H) 09/17/2021        No results found. ASSESSMENT AND PLAN:   Diagnoses and all orders for this visit:    Acute idiopathic gout of right foot            Plan:     Discussed the importance of supportive shoe gear and limited barefoot walking. Discussed possible steroid injection. Discussed possible oral steroids if patient is not diabetic, otherwise use of NSAIDS if no history of GERD or stomach upset. Discussed the importance of rest and the need for immobilization. Xray Right foot: 7/3/23 WB  Mild HAV with moderate joint space narrowing 1st MPJ. No fractures        Discussed in great detail with pt that her right foot pain could have been gout related    Will hold off on 2nd Right 1st MPJ steroid injection  Uric acid was 5.9, high normal  Pt did discuss with her PCP and will hold off on allopurinol at this time      RTC for nail care every 3 months      No follow-ups on file.     Jn Ansari DPM  7/17/23

## 2023-10-03 ENCOUNTER — OFFICE VISIT (OUTPATIENT)
Dept: PODIATRY CLINIC | Facility: CLINIC | Age: 83
End: 2023-10-03

## 2023-10-03 DIAGNOSIS — M79.675 TOE PAIN, BILATERAL: ICD-10-CM

## 2023-10-03 DIAGNOSIS — B35.1 ONYCHOMYCOSIS: Primary | ICD-10-CM

## 2023-10-03 DIAGNOSIS — M79.674 TOE PAIN, BILATERAL: ICD-10-CM

## 2023-10-03 PROCEDURE — 99213 OFFICE O/P EST LOW 20 MIN: CPT | Performed by: PODIATRIST

## 2023-10-03 NOTE — PROGRESS NOTES
[facility], [facility] Podiatry  Progress Note    Humberto Orr is a [de-identified]year old female. Patient presents with:  Toenail Care: 3 mo f/u - has no c/o regarding her feet        HPI:     This is a pleasant female with Afib on eqliuis. She presents to clinic today due to long thick painful toenails which she is unable to trim herself. Allergies: Latex and Triamcinolone   Current Outpatient Medications   Medication Sig Dispense Refill    methylPREDNISolone (MEDROL) 4 MG Oral Tablet Therapy Pack Dosepack: take as directed 1 each 0    metoprolol succinate ER 25 MG Oral Tablet 24 Hr Take 1 tablet (25 mg total) by mouth daily. carboxymethylcellulose sod PF 1 % Ophthalmic Gel 1 drop as needed. Coenzyme Q10 (COQ-10 OR) Take by mouth. atorvastatin 40 MG Oral Tab Take 1 tablet (40 mg total) by mouth daily. 90 tablet 3    Omega-3-acid Ethyl Esters 1 g Oral Cap Take 1 capsule (1 g total) by mouth 2 (two) times daily. 180 capsule 2    Lactobacillus-Inulin (CULTURELLE DIGESTIVE HEALTH OR) Take 1 capsule by mouth daily. Cholecalciferol (VITAMIN D) 1000 units Oral Tab Take 2,000 Units by mouth daily.         Past Medical History:   Diagnosis Date    Atrial flutter [facility])     Colitis     descending    Essential hypertension     Osteoarthritis, knee     Vitamin D deficiency       Past Surgical History:   Procedure Laterality Date    COLONOSCOPY  2013    sessile serrated adenoma, no dysplasia    KNEE REPLACEMENT SURGERY Right 2018    KNEE REPLACEMENT SURGERY Left 2019    Dr. Inés Perez Left     lapraroscopic          x 5     OTHER SURGICAL HISTORY      laparscopic left knee sx      Family History   Problem Relation Age of Onset    No Known Problems Mother     Stroke Father 46    No Known Problems Maternal Grandmother     No Known Problems Maternal Grandfather     No Known Problems Paternal Grandmother     No Known Problems Paternal Grandfather     No Known Problems Daughter     Hypertension Sister     Cancer Brother 58        prostate ca     Hypertension Sister       Social History    Socioeconomic History      Marital status:     Occupational History      Occupation: reitred paraprofessional at school     Tobacco Use      Smoking status: Never      Smokeless tobacco: Never    Vaping Use      Vaping Use: Never used    Substance and Sexual Activity      Alcohol use: No      Drug use: No    Other Topics      Concerns:        Reaction to local anesthetic: No          REVIEW OF SYSTEMS:   Denies nausea, fever, chills  No calf pain  No other muscle or joint aches  Denies chest pain or shortness of breath. EXAM:   There were no vitals taken for this visit. Constitutional:   Patient in no apparent distress. Well kept. Of normal body habitus. Alert and oriented to person, place, and time. Vascular Examination:  DP pulse is 2/4  PT pulse is 2/4  Capillary refill is immediate  Integumentary Examination:   The patient's nails appear incurvated, thickened, elongated, dystrophic, discolored with subungual debris 1-5 right, 1-5  left nails. Digital hair growth is present left and is present right. Skin is of diminished texture and decreased turgor. Neurological Examination:  Sharp/dull is present to right and is present to left. Parasthesias absent. Musculoskeletal Examination:  Muscle Strength is 5/5. 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             LABS & IMAGING:     Lab Results   Component Value Date    GLU 87 03/30/2022    BUN 18.0 03/30/2022    CREATSERUM 0.75 03/30/2022    BUNCREA 24.0 (H) 03/30/2022    ANIONGAP 6 02/18/2022    GFRAA 76 02/18/2022    GFRNAA 66 02/18/2022    CA 10.1 03/30/2022     03/30/2022    K 3.98 03/30/2022     03/30/2022    CO2 27.4 03/30/2022    OSMOCALC 298 (H) 02/18/2022        Lab Results   Component Value Date     09/17/2021    A1C 5.8 (H) 09/17/2021        No results found. ASSESSMENT AND PLAN:   Diagnoses and all orders for this visit:    Onychomycosis    Toe pain, bilateral              Plan:     Evaluated the patient. Discussed treatment options with the patient. Discussed with patient proper care and hygiene for their feet. Patient tolerated procedures well, without incident. Instrumentation used includes nail nippers and electric  where appropriate. Procedure: (98716 Debridement of toenails 6-10) Surgically debrided and mechanically reduced 6 or more toenails. Hemmorhage occurred none. Nails that were debrided appeared dystrophic and caused the patient pain in shoe gear. Nails 5 Left & 5 Right. Discussed in great detail with pt that her right foot pain could have been gout related    Will hold off on 2nd Right 1st MPJ steroid injection  Uric acid was 5.9, high normal  Pt did discuss with her PCP and will hold off on allopurinol at this time      RTC 3 months for nail care      No follow-ups on file.     Shannan Aguirre DPM  10/3/23

## 2024-01-24 ENCOUNTER — OFFICE VISIT (OUTPATIENT)
Dept: PODIATRY CLINIC | Facility: CLINIC | Age: 84
End: 2024-01-24

## 2024-01-24 DIAGNOSIS — M79.674 TOE PAIN, BILATERAL: ICD-10-CM

## 2024-01-24 DIAGNOSIS — B35.1 ONYCHOMYCOSIS: Primary | ICD-10-CM

## 2024-01-24 DIAGNOSIS — M79.675 TOE PAIN, BILATERAL: ICD-10-CM

## 2024-01-24 PROCEDURE — 99213 OFFICE O/P EST LOW 20 MIN: CPT | Performed by: PODIATRIST

## 2024-01-24 NOTE — PROGRESS NOTES
[facility] Podiatry  Progress Note    Alexus Mtz is a 83 year old female.   Chief Complaint   Patient presents with    Toenail Care     3MO nail care -  Pt denies any pain or concerns          HPI:     This is a pleasant female with Afib on eqliuis.        She presents to clinic today due to long thick painful toenails which she is unable to trim herself.         Allergies: Latex and Triamcinolone   Current Outpatient Medications   Medication Sig Dispense Refill    methylPREDNISolone (MEDROL) 4 MG Oral Tablet Therapy Pack Dosepack: take as directed 1 each 0    metoprolol succinate ER 25 MG Oral Tablet 24 Hr Take 1 tablet (25 mg total) by mouth daily.      carboxymethylcellulose sod PF 1 % Ophthalmic Gel 1 drop as needed.      Coenzyme Q10 (COQ-10 OR) Take by mouth.      atorvastatin 40 MG Oral Tab Take 1 tablet (40 mg total) by mouth daily. 90 tablet 3    Omega-3-acid Ethyl Esters 1 g Oral Cap Take 1 capsule (1 g total) by mouth 2 (two) times daily. 180 capsule 2    Lactobacillus-Inulin (CULTURELLE DIGESTIVE HEALTH OR) Take 1 capsule by mouth daily.      Cholecalciferol (VITAMIN D) 1000 units Oral Tab Take 2,000 Units by mouth daily.        Past Medical History:   Diagnosis Date    Atrial flutter (HCC)     Colitis     descending    Essential hypertension     Osteoarthritis, knee     Vitamin D deficiency       Past Surgical History:   Procedure Laterality Date    COLONOSCOPY  2013    sessile serrated adenoma, no dysplasia    KNEE REPLACEMENT SURGERY Right 2018    KNEE REPLACEMENT SURGERY Left 2019    Dr. Moore    KNEE SURGERY Left     lapraroscopic          x 5     OTHER SURGICAL HISTORY      laparscopic left knee sx      Family History   Problem Relation Age of Onset    No Known Problems Mother     Stroke Father 51    No Known Problems Maternal Grandmother     No Known Problems Maternal Grandfather     No Known Problems Paternal Grandmother     No Known Problems Paternal  Grandfather     No Known Problems Daughter     Hypertension Sister     Cancer Brother 62        prostate ca     Hypertension Sister       Social History     Socioeconomic History    Marital status:    Occupational History    Occupation: reitred paraprofessional at school    Tobacco Use    Smoking status: Never    Smokeless tobacco: Never   Vaping Use    Vaping Use: Never used   Substance and Sexual Activity    Alcohol use: No    Drug use: No   Other Topics Concern    Reaction to local anesthetic No           REVIEW OF SYSTEMS:   Denies nausea, fever, chills  No calf pain  No other muscle or joint aches  Denies chest pain or shortness of breath.      EXAM:   There were no vitals taken for this visit.    Constitutional:   Patient in no apparent distress. Well kept. Of normal body habitus. Alert and oriented to person, place, and time.  Vascular Examination:  DP pulse is 2/4  PT pulse is 2/4  Capillary refill is immediate  Integumentary Examination:   The patient's nails appear incurvated, thickened, elongated, dystrophic, discolored with subungual debris 1-5 right, 1-5  left nails.  Digital hair growth is present left and is present right.  Skin is of diminished texture and decreased turgor.  Neurological Examination:  Sharp/dull is present to right and is present to left.  Parasthesias absent.  Musculoskeletal Examination:  Muscle Strength is 5/5.  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            LABS & IMAGING:     Lab Results   Component Value Date    GLU 87 03/30/2022    BUN 18.0 03/30/2022    CREATSERUM 0.75 03/30/2022    BUNCREA 24.0 (H) 03/30/2022    ANIONGAP 6 02/18/2022    GFRAA 76 02/18/2022    GFRNAA 66 02/18/2022    CA 10.1 03/30/2022     03/30/2022    K 3.98 03/30/2022     03/30/2022    CO2 27.4 03/30/2022    OSMOCALC 298 (H) 02/18/2022        Lab Results   Component  Value Date     09/17/2021    A1C 5.8 (H) 09/17/2021        No results found.     ASSESSMENT AND PLAN:   Diagnoses and all orders for this visit:    Onychomycosis    Toe pain, bilateral                Plan:     Evaluated the patient. Discussed treatment options with the patient.  Discussed with patient proper care and hygiene for their feet.  Patient tolerated procedures well, without incident.    Instrumentation used includes nail nippers and electric  where appropriate.  Procedure: (17971 Debridement of toenails 6-10) Surgically debrided and mechanically reduced 6 or more toenails.Hemmorhage occurred none.Nails that were debrided appeared dystrophic and caused the patient pain in shoe gear.Nails 5 Left & 5 Right.       Discussed in great detail with pt that her right foot pain could have been gout related    Will hold off on 2nd Right 1st MPJ steroid injection  Uric acid was 5.9, high normal  Pt did discuss with her PCP and will hold off on allopurinol at this time      RTC 3 months for nail care      No follow-ups on file.    Kendrick Paulino DPM  1/24/24

## 2024-04-24 ENCOUNTER — OFFICE VISIT (OUTPATIENT)
Dept: PODIATRY CLINIC | Facility: CLINIC | Age: 84
End: 2024-04-24

## 2024-04-24 DIAGNOSIS — M79.674 TOE PAIN, BILATERAL: ICD-10-CM

## 2024-04-24 DIAGNOSIS — M79.675 TOE PAIN, BILATERAL: ICD-10-CM

## 2024-04-24 DIAGNOSIS — B35.1 ONYCHOMYCOSIS: Primary | ICD-10-CM

## 2024-04-24 PROCEDURE — 99213 OFFICE O/P EST LOW 20 MIN: CPT | Performed by: PODIATRIST

## 2024-04-24 NOTE — PROGRESS NOTES
[facility] Podiatry  Progress Note    Alexus Mtz is a 83 year old female.   Chief Complaint   Patient presents with    Toenail Care     F/u Toenail Care. Elderly patient unable to trim toenails due to medical condition.         HPI:     This is a pleasant female with Afib on eqliuis.      She presents to clinic today due to long thick painful toenails which she is unable to trim herself.         Allergies: Latex and Triamcinolone   Current Outpatient Medications   Medication Sig Dispense Refill    methylPREDNISolone (MEDROL) 4 MG Oral Tablet Therapy Pack Dosepack: take as directed 1 each 0    metoprolol succinate ER 25 MG Oral Tablet 24 Hr Take 1 tablet (25 mg total) by mouth daily.      carboxymethylcellulose sod PF 1 % Ophthalmic Gel 1 drop as needed.      Coenzyme Q10 (COQ-10 OR) Take by mouth.      atorvastatin 40 MG Oral Tab Take 1 tablet (40 mg total) by mouth daily. 90 tablet 3    Omega-3-acid Ethyl Esters 1 g Oral Cap Take 1 capsule (1 g total) by mouth 2 (two) times daily. 180 capsule 2    Lactobacillus-Inulin (CULTURELLE DIGESTIVE HEALTH OR) Take 1 capsule by mouth daily.      Cholecalciferol (VITAMIN D) 1000 units Oral Tab Take 2,000 Units by mouth daily.        Past Medical History:    Atrial flutter (HCC)    Colitis    descending    Essential hypertension    Osteoarthritis, knee    Vitamin D deficiency      Past Surgical History:   Procedure Laterality Date    Colonoscopy  2013    sessile serrated adenoma, no dysplasia    Knee replacement surgery Right 2018    Knee replacement surgery Left 2019    Dr. Moore    Knee surgery Left     lapraroscopic          x 5     Other surgical history      laparscopic left knee sx      Family History   Problem Relation Age of Onset    No Known Problems Mother     Stroke Father 51    No Known Problems Maternal Grandmother     No Known Problems Maternal Grandfather     No Known Problems Paternal Grandmother     No Known Problems  Paternal Grandfather     No Known Problems Daughter     Hypertension Sister     Cancer Brother 62        prostate ca     Hypertension Sister       Social History     Socioeconomic History    Marital status:    Occupational History    Occupation: reitred paraprofessional at school    Tobacco Use    Smoking status: Never    Smokeless tobacco: Never   Vaping Use    Vaping status: Never Used   Substance and Sexual Activity    Alcohol use: No    Drug use: No   Other Topics Concern    Reaction to local anesthetic No           REVIEW OF SYSTEMS:   Denies nausea, fever, chills  No calf pain  No other muscle or joint aches  Denies chest pain or shortness of breath.      EXAM:   There were no vitals taken for this visit.    Constitutional:   Patient in no apparent distress. Well kept. Of normal body habitus. Alert and oriented to person, place, and time.  Vascular Examination:  DP pulse is 2/4  PT pulse is 2/4  Capillary refill is immediate  Integumentary Examination:   The patient's nails appear incurvated, thickened, elongated, dystrophic, discolored with subungual debris 1-5 right, 1-5  left nails.  Digital hair growth is present left and is present right.  Skin is of diminished texture and decreased turgor.  Neurological Examination:  Sharp/dull is present to right and is present to left.  Parasthesias absent.  Musculoskeletal Examination:  Muscle Strength is 5/5.  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            LABS & IMAGING:     Lab Results   Component Value Date    GLU 87 03/30/2022    BUN 18.0 03/30/2022    CREATSERUM 0.75 03/30/2022    BUNCREA 24.0 (H) 03/30/2022    ANIONGAP 6 02/18/2022    GFRAA 76 02/18/2022    GFRNAA 66 02/18/2022    CA 10.1 03/30/2022     03/30/2022    K 3.98 03/30/2022     03/30/2022    CO2 27.4 03/30/2022    OSMOCALC 298 (H) 02/18/2022        Lab Results    Component Value Date     09/17/2021    A1C 5.8 (H) 09/17/2021        No results found.     ASSESSMENT AND PLAN:   Diagnoses and all orders for this visit:    Onychomycosis    Toe pain, bilateral                  Plan:     Evaluated the patient. Discussed treatment options with the patient.  Discussed with patient proper care and hygiene for their feet.  Patient tolerated procedures well, without incident.    Instrumentation used includes nail nippers and electric  where appropriate.  Procedure: (78014 Debridement of toenails 6-10) Surgically debrided and mechanically reduced 6 or more toenails.Hemmorhage occurred none.Nails that were debrided appeared dystrophic and caused the patient pain in shoe gear.Nails 5 Left & 5 Right.       Discussed in great detail with pt that her right foot pain could have been gout related    Will hold off on 2nd Right 1st MPJ steroid injection  Uric acid was 5.9, high normal  Pt did discuss with her PCP and will hold off on allopurinol at this time      RTC 3 months for nail care      No follow-ups on file.    Kendrick Paulino DPM  4/24/24

## 2024-07-29 ENCOUNTER — TELEPHONE (OUTPATIENT)
Dept: PHYSICAL THERAPY | Age: 84
End: 2024-07-29

## 2024-07-29 ENCOUNTER — ORDER TRANSCRIPTION (OUTPATIENT)
Dept: PHYSICAL THERAPY | Facility: HOSPITAL | Age: 84
End: 2024-07-29

## 2024-07-29 DIAGNOSIS — M16.12 UNILATERAL PRIMARY OSTEOARTHRITIS, LEFT HIP: Primary | ICD-10-CM

## 2024-07-29 DIAGNOSIS — S76.092A OTHER SPECIFIED INJURY OF MUSCLE, FASCIA AND TENDON OF LEFT HIP, INITIAL ENCOUNTER: ICD-10-CM

## 2024-08-09 ENCOUNTER — OFFICE VISIT (OUTPATIENT)
Dept: PHYSICAL THERAPY | Age: 84
End: 2024-08-09
Attending: ORTHOPAEDIC SURGERY
Payer: MEDICARE

## 2024-08-09 DIAGNOSIS — M16.12 UNILATERAL PRIMARY OSTEOARTHRITIS, LEFT HIP: Primary | ICD-10-CM

## 2024-08-09 DIAGNOSIS — S76.092A OTHER SPECIFIED INJURY OF MUSCLE, FASCIA AND TENDON OF LEFT HIP, INITIAL ENCOUNTER: ICD-10-CM

## 2024-08-09 PROCEDURE — 97110 THERAPEUTIC EXERCISES: CPT

## 2024-08-09 PROCEDURE — 97161 PT EVAL LOW COMPLEX 20 MIN: CPT

## 2024-08-09 NOTE — PROGRESS NOTES
LOWER EXTREMITY EVALUATION:     Diagnosis:   Unilateral primary osteoarthritis, left hip (M16.12)  Other specified injury of muscle, fascia and tendon of left hip, initial encounter (S75.110R)      Referring Provider: Ramin Moore  Date of Evaluation:    2024    Precautions:  Latex Allergy Next MD visit:   none scheduled  Date of Surgery: n/a     PATIENT SUMMARY   Alexus Mtz is a 83 year old female who presents to therapy today with complaints of L hip/groin pain. Pt reports this has been going on for several weeks now, but initial injury occurred when pt was rushing to get into the car, and felt a groin pull across her L hip onto her side and lower back, and down the leg. Somewhat improved since, taking tylenol, linh at night. If she gets in the car or is walking a lot, or doing exercises where she is pulling leg out to side, she has been feeling the pain. Had an x-ray of the hip at [facility], x-ray came back negative. Pt has history of R/L total knee replacements.  Pt describes pain level current 6/10, at best 2/10, at worst 10/10.   Current functional limitations include getting in/out of car, difficulty laying on her side, participating in weight management exercise classes.    Alexus describes prior level of function indep prior to this injury, exercises 3 days a week at an exercise class. Pt goals include get rid of the pain, and be able to get back to exercise class.  Past medical history was reviewed with Alexus. Significant findings include  has a past medical history of Atrial flutter (HCC), Colitis (), Essential hypertension, Osteoarthritis, knee, and Vitamin D deficiency. Pt is on Eliquis.   Past Surgical History:   Procedure Laterality Date    Colonoscopy  2013    sessile serrated adenoma, no dysplasia    Knee replacement surgery Right 2018    Knee replacement surgery Left 2019    Dr. Moore    Knee surgery Left     lapraroscopic          x 5     Other  surgical history      laparscopic left knee sx       ASSESSMENT  Alexus presents to physical therapy evaluation with primary c/o L hip and groin pain. The results of the objective tests and measures show pain with palpation to L post, lat, and ant hip regions, decreased L hip ROM and strength.  Functional deficits include but are not limited to decreased ability to move L LE through multiplanar motions required for functional movement throughout the day.  Signs and symptoms are consistent with diagnosis of Unilateral primary osteoarthritis, left hip (M16.12), Other specified injury of muscle, fascia and tendon of left hip, initial encounter (S76.662A) . Pt and PT discussed evaluation findings, pathology, POC and HEP.  Pt voiced understanding and performs HEP correctly without reported pain. Skilled Physical Therapy is medically necessary to address the above impairments and reach functional goals.     OBJECTIVE:   Palpation: pain with palpation along L SI jt region, post region of the greater trochanter, anterior to greater trochanter, inguinal region  Sensation: intact    AROM: (* denotes performed with pain)  Mild limitation in L hip ROM in all planes    Accessory motion: none    Flexibility:  Mild limitations in B hamstring, mod limitations in B piriformis L>R    Strength/MMT: (* denotes performed with pain)  Hip Knee   Flexion: R 5/5; L 3+/5  Extension: R /5; L /5  Abduction: R 4/5; L 3+/5  ER: R 4/5; L 3+/5  IR: R 4/5; L 3+/5 Flexion: R 4+/5; L 4/5  Extension: R 4+/5; L 4/5        Special tests:   none    Gait: pt ambulates on level ground with normal mechanics  Balance: SLS: R 3 sec, L 1 sec, req UE assist to obtain position    Today’s Treatment and Response:   Pt education was provided on exam findings, treatment diagnosis, treatment plan, expectations, and prognosis. Pt was also provided recommendations for activity modifications, possible soreness after evaluation, modalities as needed [ice/heat], pain  science education , detrimental fear avoidance behaviors, importance of remaining active, strategies to reduce fall risk at home, and shoe wear.  Therex: (10 min) Patient was instructed in and issued a HEP for:   Access Code: [email or web address]  URL: [email or web address]  Date: 08/10/2024  Prepared by: Brenda Gonzales  Exercises  - Seated Hip Adduction Isometrics with Ball  - 2-3 x daily - 7 x weekly - 1 sets - 10 reps  - Seated Hip Abduction with Resistance  - 2-3 x daily - 7 x weekly - 1 sets - 10 reps    Charges: PT Eval Low Complexity, 1 TE      Total Timed Treatment: 10 min     Total Treatment Time: 45 min     Based on clinical rationale and outcome measures, this evaluation involved Low Complexity decision making due to 1-2 personal factors/comorbidities, 3 body structures involved/activity limitations, and unstable symptoms including changing pain levels.  PLAN OF CARE:    Goals: (to be met in 8-10 visits)  Pt will have improved hip AROM Flex and ABD to normal ranges to be able to don/doff shoes and perform car transfers without difficulty   Pt will improve hip ABD and ER strength to 4/5 to increase ease with standing and walking   Pt will be able to squat to  light objects around the house with <2/10 hip pain   Pt will improve functional hip strength to report ability to ascend/descend 1 flight of stairs reciprocally without use of handrail   Pt will demonstrate improved SLS to >3 seconds JEFFREY to promote safety and decrease risk of falls on uneven surfaces such as grass and gravel   Pt will be independent and compliant with comprehensive HEP to maintain progress achieved in PT       Frequency / Duration: Patient will be seen for 1-2 x/week or a total of 8-10 visits over a 90 day period. Treatment will include: Gait training, Manual Therapy, Neuromuscular Re-education, Self-Care Home Management, Therapeutic Activities, Therapeutic Exercise, and Home Exercise Program instruction    Education or  treatment limitation: None  Rehab Potential:good    LEFS Score  No data recorded    Patient/Family/Caregiver was advised of these findings, precautions, and treatment options and has agreed to actively participate in planning and for this course of care.    Thank you for your referral. Please co-sign or sign and return this letter via fax as soon as possible to [phone]. If you have any questions, please contact me at Dept: [phone]    Sincerely,  Electronically signed by therapist: Brenda Gonzales PT  Physician's certification required: Yes  I certify the need for these services furnished under this plan of treatment and while under my care.    X___________________________________________________ Date____________________    Certification From: 8/9/2024  To:11/7/2024

## 2024-08-14 ENCOUNTER — OFFICE VISIT (OUTPATIENT)
Dept: PHYSICAL THERAPY | Age: 84
End: 2024-08-14
Attending: ORTHOPAEDIC SURGERY
Payer: MEDICARE

## 2024-08-14 DIAGNOSIS — M16.12 UNILATERAL PRIMARY OSTEOARTHRITIS, LEFT HIP: Primary | ICD-10-CM

## 2024-08-14 DIAGNOSIS — S76.092A OTHER SPECIFIED INJURY OF MUSCLE, FASCIA AND TENDON OF LEFT HIP, INITIAL ENCOUNTER: ICD-10-CM

## 2024-08-14 PROCEDURE — 97110 THERAPEUTIC EXERCISES: CPT

## 2024-08-14 PROCEDURE — 97530 THERAPEUTIC ACTIVITIES: CPT

## 2024-08-14 NOTE — PROGRESS NOTES
Diagnosis:   Unilateral primary osteoarthritis, left hip (M16.12)  Other specified injury of muscle, fascia and tendon of left hip, initial encounter (S76.416O)         Referring Provider: Ramin Moore  Date of Evaluation:    8/9/2024    Precautions:  Latex Allergy Next MD visit:   none scheduled  Date of Surgery: n/a   Insurance Primary/Secondary: MEDICARE / [employer] [place] PPO     # Auth Visits: MCR, 10v thru 11/7/2024            Subjective: Pt reports pain is a little better, tried moving a plant over the weekend and that bothered her, not able to kneel at [employer].     Pain: 5/10 L ant hip/groin pain      Objective: see flowsheet below      Assessment: Pt did not want to try the shuttle due to she had tried it before during knee therapy, and she had pain and what she stated as a \"setback\" afterward. Pt reported feeling a pulling sensation in anterior and lateral L hip region with most exercises, but after a few repetitions it would improve. Pt will continue to benefit from skilled PT intervention.       Goals: (to be met in 8-10 visits)  Pt will have improved hip AROM Flex and ABD to normal ranges to be able to don/doff shoes and perform car transfers without difficulty (in progress)  Pt will improve hip ABD and ER strength to 4/5 to increase ease with standing and walking (in progress)  Pt will be able to squat to  light objects around the house with <2/10 hip pain (in progress)  Pt will improve functional hip strength to report ability to ascend/descend 1 flight of stairs reciprocally without use of handrail (in progress)  Pt will demonstrate improved SLS to >3 seconds JEFFREY to promote safety and decrease risk of falls on uneven surfaces such as grass and gravel (in progress)  Pt will be independent and compliant with comprehensive HEP to maintain progress achieved in PT (in progress)    Plan: sit<>stand from decreased surface height, step-up, lateral stepping  Date: 8/14/2024  TX#: 2 Date:                 TX#: 3  Date:                 TX#: 4/ Date:                 TX#: 5/ Date:   Tx#: 6/   Therex: 30 min  Nustep L3 x12 min  Sit<>stand from elevated surface x15  Supine heel slides R/L x15  Supine hip abd/add R/L x15  Hooklying trunk rotation x10  Supine marching R/L x10       Theract: 8 min  Per pt reeducated on options for using heat/cold for pain relief, or topical like aspercreme                      HEP: Access Code: [email or web address]  URL: [email or web address]  Date: 08/14/2024  Prepared by: Brenda Gonzales  Exercises  - Seated Hip Adduction Isometrics with Ball  - 2-3 x daily - 7 x weekly - 1 sets - 10 reps  - Seated Hip Abduction with Resistance  - 2-3 x daily - 7 x weekly - 1 sets - 10 reps  - Supine Lower Trunk Rotation  - 1 x daily - 7 x weekly - 1 sets - 10 reps    Charges: 2 TE, 1 TA       Total Timed Treatment: 38 min  Total Treatment Time: 40 min

## 2024-08-16 ENCOUNTER — APPOINTMENT (OUTPATIENT)
Dept: PHYSICAL THERAPY | Age: 84
End: 2024-08-16
Attending: ORTHOPAEDIC SURGERY
Payer: MEDICARE

## 2024-08-21 ENCOUNTER — APPOINTMENT (OUTPATIENT)
Dept: PHYSICAL THERAPY | Age: 84
End: 2024-08-21
Attending: ORTHOPAEDIC SURGERY
Payer: MEDICARE

## 2024-08-30 ENCOUNTER — OFFICE VISIT (OUTPATIENT)
Dept: PHYSICAL THERAPY | Age: 84
End: 2024-08-30
Attending: ORTHOPAEDIC SURGERY
Payer: MEDICARE

## 2024-08-30 PROCEDURE — 97110 THERAPEUTIC EXERCISES: CPT

## 2024-08-30 NOTE — PROGRESS NOTES
Diagnosis:   Unilateral primary osteoarthritis, left hip (M16.12)  Other specified injury of muscle, fascia and tendon of left hip, initial encounter (S76.895Y)         Referring Provider: Ramin Moore  Date of Evaluation:    8/9/2024    Precautions:  Latex Allergy Next MD visit:   none scheduled  Date of Surgery: n/a   Insurance Primary/Secondary: MEDICARE / [employer] [place] PPO     # Auth Visits: MCR, 10v thru 11/7/2024            Subjective: Pt reports her groin was doing great until yesterday. Went to a wedding and was able to dance without any issues. Was standing talking with a friend for about a half hour while holding groceries yesterday and had increased pain after that. Rates pain 5/10 currently.     Pain: 5/10 L ant hip/groin pain      Objective: see flowsheet below      Assessment: Continued with exercises to improve functional range of L hip, including BKFO. Patient reporting L glut pain in addition to L groin pain with hip external rotation this date.       Goals: (to be met in 8-10 visits)  Pt will have improved hip AROM Flex and ABD to normal ranges to be able to don/doff shoes and perform car transfers without difficulty (in progress)  Pt will improve hip ABD and ER strength to 4/5 to increase ease with standing and walking (in progress)  Pt will be able to squat to  light objects around the house with <2/10 hip pain (in progress)  Pt will improve functional hip strength to report ability to ascend/descend 1 flight of stairs reciprocally without use of handrail (in progress)  Pt will demonstrate improved SLS to >3 seconds JEFFREY to promote safety and decrease risk of falls on uneven surfaces such as grass and gravel (in progress)  Pt will be independent and compliant with comprehensive HEP to maintain progress achieved in PT (in progress)    Plan: Continue PT for LLE stretching, core and hip strengthening. Incorporate sit<>stand from decreased surface height, step-up, lateral stepping as tolerated.    Date: 8/14/2024  TX#: 2 Date: 8/30/24                 TX#: 3 Date:                 TX#: 4/ Date:                 TX#: 5/ Date:   Tx#: 6/   Therex: 30 min  Nustep L3 x12 min  Sit<>stand from elevated surface x15  Supine heel slides R/L x15  Supine hip abd/add R/L x15  Hooklying trunk rotation x10  Supine marching R/L x10 TherEx:  Nustep L4 x 10 min  BKFO 10x L  Hooklying ball squeeze 2x10, 5\" holds  Supine marching R/L x15  Hooklying hip abd with fitness [place] 20x5\"   PROM L hip all planes x 5 min  HS stretch at stair x 30\"  Side lunge adductor stretch 10\"x10  Lateral walk at barre x 3 laps            Theract: 8 min  Per pt reeducated on options for using heat/cold for pain relief, or topical like aspercreme                      HEP: Access Code: [email or web address]  URL: [email or web address]  Date: 08/14/2024  Prepared by: Brenda Gonzales  Exercises  - Seated Hip Adduction Isometrics with Ball  - 2-3 x daily - 7 x weekly - 1 sets - 10 reps  - Seated Hip Abduction with Resistance  - 2-3 x daily - 7 x weekly - 1 sets - 10 reps  - Supine Lower Trunk Rotation  - 1 x daily - 7 x weekly - 1 sets - 10 reps    8/30/24: added side lunge adductor stretch    Charges: TE x 3       Total Timed Treatment: 38 min  Total Treatment Time: 40 min

## 2024-09-04 ENCOUNTER — TELEPHONE (OUTPATIENT)
Dept: PHYSICAL THERAPY | Age: 84
End: 2024-09-04

## 2024-09-06 ENCOUNTER — APPOINTMENT (OUTPATIENT)
Dept: PHYSICAL THERAPY | Age: 84
End: 2024-09-06
Attending: ORTHOPAEDIC SURGERY
Payer: MEDICARE

## 2025-03-13 ENCOUNTER — APPOINTMENT (OUTPATIENT)
Dept: CT IMAGING | Facility: HOSPITAL | Age: 85
End: 2025-03-13
Attending: EMERGENCY MEDICINE
Payer: MEDICARE

## 2025-03-13 ENCOUNTER — HOSPITAL ENCOUNTER (EMERGENCY)
Facility: HOSPITAL | Age: 85
Discharge: HOME | End: 2025-03-14
Attending: EMERGENCY MEDICINE
Payer: MEDICARE

## 2025-03-13 DIAGNOSIS — H93.19: Primary | ICD-10-CM

## 2025-03-13 PROCEDURE — 99285 EMERGENCY DEPT VISIT HI MDM: CPT

## 2025-03-13 PROCEDURE — 70450 CT HEAD/BRAIN W/O DYE: CPT | Performed by: EMERGENCY MEDICINE

## 2025-03-13 PROCEDURE — 99284 EMERGENCY DEPT VISIT MOD MDM: CPT

## 2025-03-13 RX ORDER — DILTIAZEM HYDROCHLORIDE 120 MG/1
120 CAPSULE, COATED, EXTENDED RELEASE ORAL DAILY
COMMUNITY
Start: 2024-08-20

## 2025-03-13 RX ORDER — HYDROCHLOROTHIAZIDE 12.5 MG/1
1 TABLET ORAL DAILY
COMMUNITY
Start: 2024-04-03

## 2025-03-14 VITALS
HEART RATE: 50 BPM | OXYGEN SATURATION: 96 % | WEIGHT: 170 LBS | DIASTOLIC BLOOD PRESSURE: 56 MMHG | BODY MASS INDEX: 31.28 KG/M2 | TEMPERATURE: 98 F | RESPIRATION RATE: 17 BRPM | SYSTOLIC BLOOD PRESSURE: 138 MMHG | HEIGHT: 62 IN

## 2025-03-14 NOTE — ED QUICK NOTES
Patient presents to ED 18 from triage with c/o \"sizzling sounds/feeling in my ears and back of my head\" that began early this morning. Patient states she did hit her head last week on a cabinet door, denies lOC or fall. Patient is on a blood thinner. Patient denies any visual disturbances. Patient is A&O x 4. No distress noted. Respirations regular and unlabored. Call light at reach.    Family at bedside.

## 2025-03-14 NOTE — ED INITIAL ASSESSMENT (HPI)
Patient to ED for pressure and a sizzling sound in her ear that started about  0400 this morning. Patient also reports having 2 nosebleeds today.. Patient also reports hitting her head last week on a cabinet +blood thinners.

## 2025-03-14 NOTE — ED QUICK NOTES
After triage is complete patient family member returns to desk without patient to report other family members called her and said patient was confused earlier today will looking at her bills. Patient alert and oriented during triage assessment and answering all questions appropriately.

## 2025-03-14 NOTE — ED PROVIDER NOTES
Patient Seen in: [facility] Emergency Department    History     Chief Complaint   Patient presents with    Ear Problem Pain       HPI    History is provided by patient/independent historian: patient, patient's daughters  84 year old female with hx of HTN, atrial flutter, brought in by family for concern.  Hearing is sizzling in her head and ears.  She had 2 nosebleeds earlier today.  She does take Eliquis for her A-fib and her daughters report concern that she did have some head trauma last week.  They are concerned for internal bleeding.  No recent cough cold symptoms, fevers.  They do report she was slightly confused earlier today.    History reviewed.   Past Medical History:    Atrial flutter (HCC)    Colitis    descending    Essential hypertension    Osteoarthritis, knee    Vitamin D deficiency         History reviewed.   Past Surgical History:   Procedure Laterality Date    Colonoscopy  2013    sessile serrated adenoma, no dysplasia    Knee replacement surgery Right 2018    Knee replacement surgery Left 2019    Dr. Moore    Knee surgery Left     lapraroscopic          x 5     Other surgical history      laparscopic left knee sx         Home Medications reviewed :  Prescriptions Prior to Admission[1]      History reviewed.   Social History     Socioeconomic History    Marital status:    Occupational History    Occupation: reitred paraprofessional at school    Tobacco Use    Smoking status: Never    Smokeless tobacco: Never   Vaping Use    Vaping status: Never Used   Substance and Sexual Activity    Alcohol use: No    Drug use: No   Other Topics Concern    Reaction to local anesthetic No         ROS  Review of Systems   HENT:          Hearing changes   Respiratory:  Negative for shortness of breath.    Cardiovascular:  Negative for chest pain.   Psychiatric/Behavioral:  Positive for confusion.    All other systems reviewed and are negative.     All other pertinent organ  systems are reviewed and are negative.      Physical Exam     ED Triage Vitals [03/13/25 2115]   BP (!) 165/71   Pulse 66   Resp 18   Temp 97.7 °F (36.5 °C)   Temp src Temporal   SpO2 96 %   O2 Device None (Room air)     Vital signs reviewed.      Physical Exam  Vitals and nursing note reviewed.   HENT:      Head: Atraumatic.      Right Ear: Tympanic membrane normal.      Left Ear: Tympanic membrane normal.   Cardiovascular:      Pulses: Normal pulses.   Pulmonary:      Effort: No respiratory distress.   Abdominal:      General: There is no distension.   Neurological:      Mental Status: She is alert.      Comments: 5/5 strength in b/l UEs and LEs, normal sensation in all extremities, normal finger to nose b/l, normal heel to shin b/l, no pronator drift, no facial asymmetry, normal speech           ED Course       Labs:   Labs Reviewed - No data to display      My EKG Interpretation:   As reviewed and Interpreted by me      Imaging Results Available and Reviewed while in ED:   No results found.    CT head without contrast  Comparison exam: CT head 2/17/2022    IMPRESSION:  No acute intracranial hemorrhage.  Atrophy and small vessel ischemic disease.        Results faxed at 12:49 AM ET.  Please call with any questions.      Julia Rai M.D.   This report has been electronically signed and verified by the Radiologist whose name is printed above.  My review and independent interpretation of CT images: no ICH. Radiology report corroborates this in addition to other details as reported by them.      Decision rules/scores evaluated: none      Diagnostic labs/tests considered but not ordered: CBC, BMP, uA    ED Medications Administered: Medications - No data to display             MDM       Medical Decision Making      Differential Diagnosis: After obtaining the patient's history, performing the physical exam and reviewing the diagnostics, multiple initial diagnoses were considered based on the presenting problem  including ICH, fracture, contusion, sinusitis, tumor    External document review: I personally reviewed available external medical records for any recent pertinent discharge summaries, testing, and procedures - the findings are as follows: yesterday visit with Dr. Cobos for tinnitus    Complicating Factors: The patient already  has a past medical history of Atrial flutter (HCC), Colitis (2014), Essential hypertension, Osteoarthritis, knee, and Vitamin D deficiency. to contribute to the complexity of this ED evaluation.    Procedures performed: none    Discussed management with physician/appropriate source: none    Considered admission/deescalation of care for: none    Social determinants of health affecting patient care: none    Prescription medications considered: discussed continuing current medication regimen    The patient requires continuous monitoring for: tinnitus    Shared decision making: discussed possible admission        Disposition and Plan     Clinical Impression:  1. Buzzing in ear, unspecified laterality        Disposition:  Discharge    Follow-up:  Radha Cobos MD  [address] [place] [address] [phone]    Follow up        Medications Prescribed:  Current Discharge Medication List                         [1] (Not in a hospital admission)

## (undated) NOTE — LETTER
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 Pt reports this has been going on for several weeks now, but initial injury occurred when pt was rushing to get into the car, and felt a groin pull across her L hip onto her side and lower back, and down the leg. Somewhat improved since, taking tylenol, linh at night. If she gets in the car or is walking a lot, or doing exercises where she is pulling leg out to side, she has been feeling the pain. Had an x-ray of the hip at [facility], x-ray came back negative. Pt has history of R/L total knee replacements.  Pt describes pain level current 6/10, at best 2/10, at worst 10/10.   Current functional limitations include getting in/out of car, difficulty laying on her side, participating in weight management exercise classes.    Alexus describes prior level of function indep prior to this injury, exercises 3 days a week at an exercise class. Pt goals include get rid of the pain, and be able to get back to exercise class.  Past medical history was reviewed with Alexus. Significant findings include  has a past medical history of Atrial flutter (HCC), Colitis (2014), Essential hypertension, Osteoarthritis, knee, and Vitamin D deficiency. Pt is on Eliquis.   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 Moore    Knee surgery Left     lapraroscopic          x 5     Other surgical history      laparscopic left knee sx       ASSESSMENT  Alexus presents to physical therapy evaluation with primary c/o L hip and groin pain. The results of the objective tests and measures show pain with palpation to L post, lat, and ant hip regions, decreased L hip ROM and strength.  Functional deficits include but are not limited to decreased ability to move L LE through multiplanar motions required for functional movement throughout the day.  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 Pt and PT discussed evaluation findings, pathology, POC and HEP.  Pt voiced understanding and performs HEP correctly without reported pain. Skilled Physical Therapy is medically necessary to address the above impairments and reach functional goals.     OBJECTIVE:   Palpation: pain with palpation along L SI jt region, post region of the greater trochanter, anterior to greater trochanter, inguinal region  Sensation: intact    AROM: (* denotes performed with pain)  Mild limitation in L hip ROM in all planes    Accessory motion: none    Flexibility:  Mild limitations in B hamstring, mod limitations in B piriformis L>R    Strength/MMT: (* denotes performed with pain)  Hip Knee   Flexion: R 5/5; L 3+/5  Extension: R /5; L /5  Abduction: R 4/5; L 3+/5  ER: R 4/5; L 3+/5  IR: R 4/5; L 3+/5 Flexion: R 4+/5; L 4/5  Extension: R 4+/5; L 4/5        Special tests:   none    Gait: pt ambulates on level ground with normal mechanics  Balance: SLS: R 3 sec, L 1 sec, req UE assist to obtain position    Today’s Treatment and Response:   Pt education was provided on exam findings, treatment diagnosis, treatment plan, expectations, and  prognosis. Pt was also provided recommendations for activity modifications, possible soreness after evaluation, modalities as needed [ice/heat], pain science education , detrimental fear avoidance behaviors, importance of remaining active, strategies to reduce fall risk at home, and shoe wear.  Therex: (10 min) Patient was instructed in and issued a HEP for:   Access Code: [email or web address]  URL: [email or web address]  Date: 08/10/2024  Prepared by: Brenda Gonzales  Exercises  - Seated Hip Adduction Isometrics with Ball  - 2-3 x daily - 7 x weekly - 1 sets - 10 reps  - Seated Hip Abduction with Resistance  - 2-3 x daily - 7 x weekly - 1 sets - 10 reps    Charges: PT Eval Low Complexity, 1 TE      Total Timed Treatment: 10 min     Total Treatment Time: 45 min     Based on clinical rationale and outcome measures, this evaluation involved Low Complexity decision making due to 1-2 personal factors/comorbidities, 3 body structures involved/activity limitations, and unstable symptoms including changing pain levels.  PLAN OF CARE:    Goals: (to be met in 8-10 visits)  Pt will have improved hip AROM Flex and ABD to normal ranges to be able to don/doff shoes and perform car transfers without difficulty   Pt will improve hip ABD and ER strength to 4/5 to increase ease with standing and walking   Pt will be able to squat to  light objects around the house with <2/10 hip pain   Pt will improve functional hip strength to report ability to ascend/descend 1 flight of stairs reciprocally without use of handrail   Pt will demonstrate improved SLS to >3 seconds JEFFREY to promote safety and decrease risk of falls on uneven surfaces such as grass and gravel   Pt will be independent and compliant with comprehensive HEP to maintain progress achieved in PT       Frequency / Duration: Patient will be seen for 1-2 x/week or a total of 8-10 visits over a 90 day period. Treatment will include: Gait training, Manual Therapy,  Neuromuscular Re-education, Self-Care Home Management, Therapeutic Activities, Therapeutic Exercise, and Home Exercise Program instruction    Education or treatment limitation: None  Rehab Potential:good    LEFS Score  No data recorded    Patient/Family/Caregiver was advised of these findings, precautions, and treatment options and has agreed to actively participate in planning and for this course of care.    Thank you for your referral. Please co-sign or sign and return this letter via fax as soon as possible to [phone]. If you have any questions, please contact me at Dept: [phone]    Sincerely,  Electronically signed by therapist: Brenda Gonzales PT  Physician's certification required: Yes  I certify the need for these services furnished under this plan of treatment and while under my care.    X___________________________________________________ Date____________________    Certification From: 8/9/2024  To:11/7/2024 21st Century Cures Act Notice to Patient: Medical documents like this are made available to patients in the interest of transparency. However, be advised this is a medical document and it is intended as [id number] communication between your medical providers. This medical document may contain abbreviations, assessments, medical data, and results or other terms that are unfamiliar. Medical documents are intended to carry relevant information, facts as evident, and the clinical opinion of the practitioner. As such, this medical document may be written in language that appears blunt or direct. You are encouraged to contact your medical provider and/or [facility] if you have any questions about this medical document.